# Patient Record
Sex: FEMALE | Race: OTHER | NOT HISPANIC OR LATINO | URBAN - METROPOLITAN AREA
[De-identification: names, ages, dates, MRNs, and addresses within clinical notes are randomized per-mention and may not be internally consistent; named-entity substitution may affect disease eponyms.]

---

## 2018-06-11 PROBLEM — Z00.00 ENCOUNTER FOR PREVENTIVE HEALTH EXAMINATION: Status: ACTIVE | Noted: 2018-06-11

## 2018-06-15 ENCOUNTER — EMERGENCY (EMERGENCY)
Facility: HOSPITAL | Age: 38
LOS: 1 days | Discharge: HOME | End: 2018-06-15
Attending: EMERGENCY MEDICINE | Admitting: EMERGENCY MEDICINE
Payer: COMMERCIAL

## 2018-06-15 VITALS
DIASTOLIC BLOOD PRESSURE: 60 MMHG | TEMPERATURE: 99 F | RESPIRATION RATE: 16 BRPM | OXYGEN SATURATION: 100 % | SYSTOLIC BLOOD PRESSURE: 113 MMHG | HEART RATE: 86 BPM

## 2018-06-15 VITALS
SYSTOLIC BLOOD PRESSURE: 129 MMHG | OXYGEN SATURATION: 100 % | RESPIRATION RATE: 14 BRPM | HEART RATE: 118 BPM | TEMPERATURE: 98 F | DIASTOLIC BLOOD PRESSURE: 84 MMHG

## 2018-06-15 LAB
ALBUMIN SERPL ELPH-MCNC: 4.6 G/DL — SIGNIFICANT CHANGE UP (ref 3.3–5)
ALP SERPL-CCNC: 80 U/L — SIGNIFICANT CHANGE UP (ref 40–120)
ALT FLD-CCNC: 31 U/L — SIGNIFICANT CHANGE UP (ref 4–33)
APPEARANCE UR: CLEAR — SIGNIFICANT CHANGE UP
AST SERPL-CCNC: 26 U/L — SIGNIFICANT CHANGE UP (ref 4–32)
BASOPHILS # BLD AUTO: 0.03 K/UL — SIGNIFICANT CHANGE UP (ref 0–0.2)
BASOPHILS NFR BLD AUTO: 0.3 % — SIGNIFICANT CHANGE UP (ref 0–2)
BILIRUB SERPL-MCNC: 0.4 MG/DL — SIGNIFICANT CHANGE UP (ref 0.2–1.2)
BILIRUB UR-MCNC: NEGATIVE — SIGNIFICANT CHANGE UP
BLD GP AB SCN SERPL QL: NEGATIVE — SIGNIFICANT CHANGE UP
BLOOD UR QL VISUAL: HIGH
BUN SERPL-MCNC: 8 MG/DL — SIGNIFICANT CHANGE UP (ref 7–23)
CALCIUM SERPL-MCNC: 9.6 MG/DL — SIGNIFICANT CHANGE UP (ref 8.4–10.5)
CHLORIDE SERPL-SCNC: 99 MMOL/L — SIGNIFICANT CHANGE UP (ref 98–107)
CO2 SERPL-SCNC: 24 MMOL/L — SIGNIFICANT CHANGE UP (ref 22–31)
COLOR SPEC: SIGNIFICANT CHANGE UP
CREAT SERPL-MCNC: 0.58 MG/DL — SIGNIFICANT CHANGE UP (ref 0.5–1.3)
EOSINOPHIL # BLD AUTO: 0.04 K/UL — SIGNIFICANT CHANGE UP (ref 0–0.5)
EOSINOPHIL NFR BLD AUTO: 0.4 % — SIGNIFICANT CHANGE UP (ref 0–6)
GLUCOSE SERPL-MCNC: 118 MG/DL — HIGH (ref 70–99)
GLUCOSE UR-MCNC: NEGATIVE — SIGNIFICANT CHANGE UP
HCG SERPL-ACNC: SIGNIFICANT CHANGE UP MIU/ML
HCT VFR BLD CALC: 37.4 % — SIGNIFICANT CHANGE UP (ref 34.5–45)
HGB BLD-MCNC: 12.8 G/DL — SIGNIFICANT CHANGE UP (ref 11.5–15.5)
IMM GRANULOCYTES # BLD AUTO: 0.06 # — SIGNIFICANT CHANGE UP
IMM GRANULOCYTES NFR BLD AUTO: 0.6 % — SIGNIFICANT CHANGE UP (ref 0–1.5)
KETONES UR-MCNC: NEGATIVE — SIGNIFICANT CHANGE UP
LEUKOCYTE ESTERASE UR-ACNC: HIGH
LYMPHOCYTES # BLD AUTO: 1.64 K/UL — SIGNIFICANT CHANGE UP (ref 1–3.3)
LYMPHOCYTES # BLD AUTO: 17 % — SIGNIFICANT CHANGE UP (ref 13–44)
MCHC RBC-ENTMCNC: 31.7 PG — SIGNIFICANT CHANGE UP (ref 27–34)
MCHC RBC-ENTMCNC: 34.2 % — SIGNIFICANT CHANGE UP (ref 32–36)
MCV RBC AUTO: 92.6 FL — SIGNIFICANT CHANGE UP (ref 80–100)
MONOCYTES # BLD AUTO: 0.59 K/UL — SIGNIFICANT CHANGE UP (ref 0–0.9)
MONOCYTES NFR BLD AUTO: 6.1 % — SIGNIFICANT CHANGE UP (ref 2–14)
NEUTROPHILS # BLD AUTO: 7.26 K/UL — SIGNIFICANT CHANGE UP (ref 1.8–7.4)
NEUTROPHILS NFR BLD AUTO: 75.6 % — SIGNIFICANT CHANGE UP (ref 43–77)
NITRITE UR-MCNC: NEGATIVE — SIGNIFICANT CHANGE UP
NRBC # FLD: 0 — SIGNIFICANT CHANGE UP
PH UR: 7 — SIGNIFICANT CHANGE UP (ref 4.6–8)
PLATELET # BLD AUTO: 245 K/UL — SIGNIFICANT CHANGE UP (ref 150–400)
PMV BLD: 9.3 FL — SIGNIFICANT CHANGE UP (ref 7–13)
POTASSIUM SERPL-MCNC: 3.7 MMOL/L — SIGNIFICANT CHANGE UP (ref 3.5–5.3)
POTASSIUM SERPL-SCNC: 3.7 MMOL/L — SIGNIFICANT CHANGE UP (ref 3.5–5.3)
PROT SERPL-MCNC: 8.1 G/DL — SIGNIFICANT CHANGE UP (ref 6–8.3)
PROT UR-MCNC: NEGATIVE MG/DL — SIGNIFICANT CHANGE UP
RBC # BLD: 4.04 M/UL — SIGNIFICANT CHANGE UP (ref 3.8–5.2)
RBC # FLD: 12.1 % — SIGNIFICANT CHANGE UP (ref 10.3–14.5)
RBC CASTS # UR COMP ASSIST: >50 — HIGH (ref 0–?)
RH IG SCN BLD-IMP: POSITIVE — SIGNIFICANT CHANGE UP
SODIUM SERPL-SCNC: 139 MMOL/L — SIGNIFICANT CHANGE UP (ref 135–145)
SP GR SPEC: 1.01 — SIGNIFICANT CHANGE UP (ref 1–1.04)
SQUAMOUS # UR AUTO: SIGNIFICANT CHANGE UP
UROBILINOGEN FLD QL: NORMAL MG/DL — SIGNIFICANT CHANGE UP
WBC # BLD: 9.62 K/UL — SIGNIFICANT CHANGE UP (ref 3.8–10.5)
WBC # FLD AUTO: 9.62 K/UL — SIGNIFICANT CHANGE UP (ref 3.8–10.5)
WBC UR QL: HIGH (ref 0–?)

## 2018-06-15 PROCEDURE — 76830 TRANSVAGINAL US NON-OB: CPT | Mod: 26

## 2018-06-15 PROCEDURE — 99284 EMERGENCY DEPT VISIT MOD MDM: CPT | Mod: 25

## 2018-06-15 RX ORDER — SODIUM CHLORIDE 9 MG/ML
1000 INJECTION INTRAMUSCULAR; INTRAVENOUS; SUBCUTANEOUS ONCE
Qty: 0 | Refills: 0 | Status: DISCONTINUED | OUTPATIENT
Start: 2018-06-15 | End: 2018-06-15

## 2018-06-15 NOTE — ED PROVIDER NOTE - ATTENDING CONTRIBUTION TO CARE
37F G1PO at approx 8wks by LMP 4/8 presents with vaginal bleeding.  Reports only notes bleeding after urination. No abdominal pain. Has not yet seen an OB during this pregnancy, but has an appointment scheduled for next Thursday. On exam well appearing, nad, breathing comfortably, abd soft, per PA cervix closed, scant blood in vaginal vault. U/s with IUP, size c/w 6wks, no fetal heatbeat. Concern for fetal demise vs early pregnancy, will need repeat u/s.  Labs o/w reassuring.  Discussed w patient and , understand diagnosis and return precautions.

## 2018-06-15 NOTE — ED PROVIDER NOTE - OBJECTIVE STATEMENT
Pt preferred  translate in Tamil, refused phone translation at this time.  36 yo F, G1PO, currently 8 weeks pregnant, LMP 4/8/18, irregular periods, presents to ER c/o mild vaginal bleeding since yesterday, not associate with abd pain. States noticed mild intermittent vaginal bleeding while going to the bathroom x3 yesterday, and twice today, not using any pads. Denies any spotting, dripping, or active bleeding. Reports she missed her period, saw her PCP and found out she was pregnant, has scheduled appointment to see new GYN Dr. Martin on 6/21/18. Denies any fever, chills, n/v, abdominal pain, pelvic pain, vaginal discharge/clots, injury/trauma, chest pain, sob, dizziness, back pain, leg swelling, recent travel, or any other complaints.

## 2018-06-15 NOTE — ED PROVIDER NOTE - PROGRESS NOTE DETAILS
PA KAY: US shows single intrauterine pregnancy, estimated gestational 6 weeks, no embryonic HR, probable demise, recommends f/u with 1 week. Discussed with attending, pt will need repeat US in one week. PA KAY: US shows single intrauterine pregnancy, estimated gestational 6 weeks, no embryonic HR, probable demise, recommends f/u with 1 week. Discussed with attending, pt will need repeat US in one week. Discussed with attending, patient can dispo home to f/u with GYN. The patient was given verbal and written discharge instructions. Specifically, instructions when to return to the ED and when to seek follow-up from their pcp was discussed. Any specialty follow-up was discussed, including how to make an appointment.  Instructions were discussed in simple, plain language and was understood by the patient. The patient understands that should their symptoms worsen or any new symptoms arise, they should return to the ED immediately for further evaluation. All pt's questions were answered. Patient verbalizes understanding. Instructions translated by .

## 2018-06-15 NOTE — ED PROVIDER NOTE - MEDICAL DECISION MAKING DETAILS
36 yo F, G1PO, currently 8 weeks pregnant, LMP 18, irregular periods, presents to ER c/o mild vaginal bleeding since yesterday, not associate with abd pain.   -well appearing female, , currently 8 weeks pregnant p/w mild intermittent vaginal bleeding, no abd/pelvic pain, no injury/trauma, no dysuria, no hematuria, no n/v. +tachycardia 118. Plan: TVUS r/o ectopic pregnancy, labs, HCG, Ua, T&S, coags, and reassess.

## 2018-06-15 NOTE — ED PROVIDER NOTE - PLAN OF CARE
Follow up with your PMD within 48-72 hrs. Follow up with your OBGYN within 1-2 days, needs to repeat ultrasound in 1 week. Rest, increase your fluids. No heavy lifting. Refrain from sexual intercourse.  Worsening pain, vaginal bleeding more than 1 pad in an hour, vomiting, dizziness, weakness or ANY NEW CONCERNING SYMPTOMS return to the emergency department. Follow up with your PMD within 48-72 hrs. Follow up with your OBGYN 6/21/18 for repeat ultrasound in 1 week. Rest, increase your fluids. No heavy lifting. Refrain from sexual intercourse.  Worsening pain, vaginal bleeding more than 1 pad in an hour, vomiting, dizziness, weakness or ANY NEW CONCERNING SYMPTOMS return to the emergency department.

## 2018-06-15 NOTE — ED ADULT TRIAGE NOTE - CHIEF COMPLAINT QUOTE
Pt c/o vaginal bleeding x 2 days,  approx. 8 weeks pregnant. Pt only notices blood when she wipes, is not soaking through pads. Pt not actively bleeding. Denies pain, nausea, vomiting. Denies PMH. Pt c/o vaginal bleeding x 2 days,  approx. 8 weeks pregnant, LMP 18. Pt only notices blood when she wipes, is not soaking through pads. Pt not actively bleeding. Denies pain, nausea, vomiting. Denies PMH.

## 2018-06-15 NOTE — ED ADULT NURSE NOTE - CHIEF COMPLAINT QUOTE
Pt c/o vaginal bleeding x 2 days,  approx. 8 weeks pregnant, LMP 18. Pt only notices blood when she wipes, is not soaking through pads. Pt not actively bleeding. Denies pain, nausea, vomiting. Denies PMH.

## 2018-06-15 NOTE — ED ADULT NURSE NOTE - OBJECTIVE STATEMENT
Pt received in er room #21 complaining of vaginal spotting x 2days. Pt appears calm and cooperative with  at bedside who translates. Pt primarily speaks Tamil.  Pt denies any abd pain or discomfort pain, pt denies pain anywhere,  patient denies excessive vaginal bleeding or sanitary pad use due to bleed, only states she has spot bleeding. Pt is 8 weeks pregnant. OSCAR Artis did a vaginal exam. Blood and urine were drawn and sent to the lab. Pt transported to /S dept.

## 2018-06-15 NOTE — ED PROVIDER NOTE - NONTENDER LOCATION
left lower quadrant/suprapubic/left upper quadrant/right upper quadrant/umbilical/right costovertebral angle/right lower quadrant/periumbilical/left costovertebral angle

## 2018-06-15 NOTE — ED PROVIDER NOTE - CARE PLAN
Principal Discharge DX:	Vaginal bleeding in pregnancy, first trimester  Assessment and plan of treatment:	Follow up with your PMD within 48-72 hrs. Follow up with your OBGYN within 1-2 days, needs to repeat ultrasound in 1 week. Rest, increase your fluids. No heavy lifting. Refrain from sexual intercourse.  Worsening pain, vaginal bleeding more than 1 pad in an hour, vomiting, dizziness, weakness or ANY NEW CONCERNING SYMPTOMS return to the emergency department. Principal Discharge DX:	Vaginal bleeding in pregnancy, first trimester  Assessment and plan of treatment:	Follow up with your PMD within 48-72 hrs. Follow up with your OBGYN 6/21/18 for repeat ultrasound in 1 week. Rest, increase your fluids. No heavy lifting. Refrain from sexual intercourse.  Worsening pain, vaginal bleeding more than 1 pad in an hour, vomiting, dizziness, weakness or ANY NEW CONCERNING SYMPTOMS return to the emergency department.

## 2018-06-16 LAB
BACTERIA UR CULT: SIGNIFICANT CHANGE UP
SPECIMEN SOURCE: SIGNIFICANT CHANGE UP

## 2018-06-17 ENCOUNTER — EMERGENCY (EMERGENCY)
Facility: HOSPITAL | Age: 38
LOS: 1 days | Discharge: HOME | End: 2018-06-17
Attending: EMERGENCY MEDICINE | Admitting: EMERGENCY MEDICINE
Payer: COMMERCIAL

## 2018-06-17 ENCOUNTER — RESULT REVIEW (OUTPATIENT)
Age: 38
End: 2018-06-17

## 2018-06-17 VITALS
DIASTOLIC BLOOD PRESSURE: 73 MMHG | HEART RATE: 122 BPM | SYSTOLIC BLOOD PRESSURE: 123 MMHG | TEMPERATURE: 98 F | OXYGEN SATURATION: 100 % | RESPIRATION RATE: 17 BRPM

## 2018-06-17 VITALS
RESPIRATION RATE: 16 BRPM | DIASTOLIC BLOOD PRESSURE: 61 MMHG | HEART RATE: 98 BPM | SYSTOLIC BLOOD PRESSURE: 98 MMHG | OXYGEN SATURATION: 100 %

## 2018-06-17 DIAGNOSIS — O03.9 COMPLETE OR UNSPECIFIED SPONTANEOUS ABORTION WITHOUT COMPLICATION: ICD-10-CM

## 2018-06-17 LAB
ALBUMIN SERPL ELPH-MCNC: 4.8 G/DL — SIGNIFICANT CHANGE UP (ref 3.3–5)
ALP SERPL-CCNC: 90 U/L — SIGNIFICANT CHANGE UP (ref 40–120)
ALT FLD-CCNC: 40 U/L — HIGH (ref 4–33)
AST SERPL-CCNC: 44 U/L — HIGH (ref 4–32)
BASOPHILS # BLD AUTO: 0.05 K/UL — SIGNIFICANT CHANGE UP (ref 0–0.2)
BASOPHILS NFR BLD AUTO: 0.3 % — SIGNIFICANT CHANGE UP (ref 0–2)
BILIRUB SERPL-MCNC: 0.3 MG/DL — SIGNIFICANT CHANGE UP (ref 0.2–1.2)
BUN SERPL-MCNC: 9 MG/DL — SIGNIFICANT CHANGE UP (ref 7–23)
CALCIUM SERPL-MCNC: 9.9 MG/DL — SIGNIFICANT CHANGE UP (ref 8.4–10.5)
CHLORIDE SERPL-SCNC: 101 MMOL/L — SIGNIFICANT CHANGE UP (ref 98–107)
CO2 SERPL-SCNC: 22 MMOL/L — SIGNIFICANT CHANGE UP (ref 22–31)
CREAT SERPL-MCNC: 0.59 MG/DL — SIGNIFICANT CHANGE UP (ref 0.5–1.3)
EOSINOPHIL # BLD AUTO: 0.04 K/UL — SIGNIFICANT CHANGE UP (ref 0–0.5)
EOSINOPHIL NFR BLD AUTO: 0.3 % — SIGNIFICANT CHANGE UP (ref 0–6)
GLUCOSE SERPL-MCNC: 90 MG/DL — SIGNIFICANT CHANGE UP (ref 70–99)
HCG SERPL-ACNC: SIGNIFICANT CHANGE UP MIU/ML
HCT VFR BLD CALC: 38.3 % — SIGNIFICANT CHANGE UP (ref 34.5–45)
HGB BLD-MCNC: 13.5 G/DL — SIGNIFICANT CHANGE UP (ref 11.5–15.5)
IMM GRANULOCYTES # BLD AUTO: 0.07 # — SIGNIFICANT CHANGE UP
IMM GRANULOCYTES NFR BLD AUTO: 0.5 % — SIGNIFICANT CHANGE UP (ref 0–1.5)
LYMPHOCYTES # BLD AUTO: 1.6 K/UL — SIGNIFICANT CHANGE UP (ref 1–3.3)
LYMPHOCYTES # BLD AUTO: 10.9 % — LOW (ref 13–44)
MCHC RBC-ENTMCNC: 31.3 PG — SIGNIFICANT CHANGE UP (ref 27–34)
MCHC RBC-ENTMCNC: 35.2 % — SIGNIFICANT CHANGE UP (ref 32–36)
MCV RBC AUTO: 88.9 FL — SIGNIFICANT CHANGE UP (ref 80–100)
MONOCYTES # BLD AUTO: 0.8 K/UL — SIGNIFICANT CHANGE UP (ref 0–0.9)
MONOCYTES NFR BLD AUTO: 5.4 % — SIGNIFICANT CHANGE UP (ref 2–14)
NEUTROPHILS # BLD AUTO: 12.14 K/UL — HIGH (ref 1.8–7.4)
NEUTROPHILS NFR BLD AUTO: 82.6 % — HIGH (ref 43–77)
NRBC # FLD: 0 — SIGNIFICANT CHANGE UP
PLATELET # BLD AUTO: 267 K/UL — SIGNIFICANT CHANGE UP (ref 150–400)
PMV BLD: 9 FL — SIGNIFICANT CHANGE UP (ref 7–13)
POTASSIUM SERPL-MCNC: 4.5 MMOL/L — SIGNIFICANT CHANGE UP (ref 3.5–5.3)
POTASSIUM SERPL-SCNC: 4.5 MMOL/L — SIGNIFICANT CHANGE UP (ref 3.5–5.3)
PROT SERPL-MCNC: 8.2 G/DL — SIGNIFICANT CHANGE UP (ref 6–8.3)
RBC # BLD: 4.31 M/UL — SIGNIFICANT CHANGE UP (ref 3.8–5.2)
RBC # FLD: 12.1 % — SIGNIFICANT CHANGE UP (ref 10.3–14.5)
SODIUM SERPL-SCNC: 140 MMOL/L — SIGNIFICANT CHANGE UP (ref 135–145)
WBC # BLD: 14.7 K/UL — HIGH (ref 3.8–10.5)
WBC # FLD AUTO: 14.7 K/UL — HIGH (ref 3.8–10.5)

## 2018-06-17 PROCEDURE — 99285 EMERGENCY DEPT VISIT HI MDM: CPT | Mod: 25

## 2018-06-17 PROCEDURE — 88305 TISSUE EXAM BY PATHOLOGIST: CPT | Mod: 26

## 2018-06-17 PROCEDURE — 76830 TRANSVAGINAL US NON-OB: CPT | Mod: 26

## 2018-06-17 RX ORDER — SODIUM CHLORIDE 9 MG/ML
1000 INJECTION INTRAMUSCULAR; INTRAVENOUS; SUBCUTANEOUS ONCE
Qty: 0 | Refills: 0 | Status: COMPLETED | OUTPATIENT
Start: 2018-06-17 | End: 2018-06-17

## 2018-06-17 RX ADMIN — SODIUM CHLORIDE 1000 MILLILITER(S): 9 INJECTION INTRAMUSCULAR; INTRAVENOUS; SUBCUTANEOUS at 08:35

## 2018-06-17 NOTE — ED PROVIDER NOTE - PROGRESS NOTE DETAILS
AK: Pelvic exam, chaperone felix. Blood pooling in the vaginal canal. Product of conception noted and pulled out. Placed in cup and labeled. Cervix open to fingertip. No active bleeding noted. AK: Seen by OB. specimen taken by OB. OB recommends DC and f/u at already scheduled appt.

## 2018-06-17 NOTE — ED PROVIDER NOTE - PHYSICAL EXAMINATION
Gen: No acute distress, alert, cooperative  Head: Normocephalic, Atraumatic  HEENT: PERRL, oral mucosa moist, normal conjunctiva  Lung: CTAB, no respiratory distress, no crackles or wheezes  CV: rrr, no murmur  Abd: soft, NTND, no rebound or guarding  MSK: No LE edema, no visible deformities  Neuro: No focal neurologic deficits  Skin: Warm and dry, no evidence of rash   Psych: normal affect, follows commands

## 2018-06-17 NOTE — ED ADULT NURSE REASSESSMENT NOTE - NS ED NURSE REASSESS COMMENT FT1
IV access obtained.  Labs drawn and sent.  Pt endorses vaginal bleeding.  Pad count of 3.  Awaiting US.  Call bell within reach, spouse at bedside.  Instructed to call for assistance as needed.  Continue to monitor.

## 2018-06-17 NOTE — ED PROVIDER NOTE - OBJECTIVE STATEMENT
38 yo F, G1PO, LMP 4/8/18, recently in ED 2 days ago for vaginal bleeding found to have fetus with no embryonic HR and was told to f/u in 1 week. Comes in today with worsening bleeding, has gone through 2 pads today. Also with cramping with bleeding. Vaginal bleeding with some clots and clearish liquid.   Denies any fever, chills, n/v, chest pain, sob, dizziness, back pain, leg swelling.

## 2018-06-17 NOTE — ED ADULT NURSE REASSESSMENT NOTE - NS ED NURSE REASSESS COMMENT FT1
Pt ambulating to restroom with spouse.  OB/GYN currently at bedside to examine pt.  Pt endorses 1 pad since arrival.

## 2018-06-17 NOTE — CONSULT NOTE ADULT - SUBJECTIVE AND OBJECTIVE BOX
37y Last Menstrual Period 18 approx 6w GA by US in ED 6/15 with likely MAB, Pt began bleeding this AM with passage of clots and tissue. Patient denies headache, chest pain, shortness of breath, fevers, chills, nausea, vomiting, diarrhea, epigastric pain, urinary symptoms.         OB/GYN HISTORY: none  PAST MEDICAL & SURGICAL HISTORY:  No pertinent past medical history  No significant past surgical history    Allergies    No Known Allergies    Intolerances      MEDICATIONS  (STANDING):none    FAMILY HISTORY:  No pertinent family history in first degree relatives    SOCIAL HISTORY:nc    Name of GYN Physician: Mario         Vital Signs Last 24 Hrs  T(C): 36.8 (2018 06:17), Max: 36.8 (2018 06:17)  T(F): 98.3 (2018 06:17), Max: 98.3 (2018 06:17)  HR: 98 (2018 12:00) (96 - 122)  BP: 98/61 (2018 12:00) (98/61 - 123/73)  BP(mean): --  RR: 16 (2018 12:00) (16 - 17)  SpO2: 100% (2018 12:00) (100% - 100%)    PHYSICAL EXAM:      Constitutional: alert and oriented x 3    Gastrointestinal: soft, non tender, non-distended,  no rebound/guarding, + bowel sounds. No organomegaly, no palpable masses    Genitourinary: Normal external female  exam  Cervix: 0.5/long, no CMT  Vaginal: normal vaginal mucosa, 25cc blood in vault  Uterus: Normal size, non tender  Adnexa: Non tender bilaterally, no palpable masses    Rectal: deferred    Extremities: Non-tender bilaterally, No edema    Neurological: Grossly intact            LABS:                        13.5   14.70 )-----------( 267      ( 2018 08:46 )             38.3         140  |  101  |  9   ----------------------------<  90  4.5   |  22  |  0.59    Ca    9.9      2018 08:46    TPro  8.2  /  Alb  4.8  /  TBili  0.3  /  DBili  x   /  AST  44<H>  /  ALT  40<H>  /  AlkPhos  90            Blood Type: O Positive      RADIOLOGY & ADDITIONAL STUDIES:< from: US Transvaginal (18 @ 10:52) >    Uterus: Anteverted. 8.4 x 4.4 x 5.3 cm.    Endometrium: Previously identified intrauterine gestation is no longer   visualized. The endometrium is avascular and heterogeneous in appearance,   measuring 17 mm, some of which represents blood products..    Right ovary: 3.7 x 1.6 x 3.0 cm. Corpus luteal cyst measuring 1.7 x 1.0 x   1.6 cm..    Left ovary: 2.6 x 1.1 x 2.3 cm. Within normal limits.    Fluid: None.    IMPRESSION:   in progress, heterogeneous endometrium without previously   identified intrauterine gestation.    < end of copied text >

## 2018-06-17 NOTE — CONSULT NOTE ADULT - PROBLEM SELECTOR RECOMMENDATION 9
Pt with avascular endometrium, no retained POC  Blood product will pass spontaneously  VS WNL, Hct stable  POC sent to path     Follow up with Dr Martin as scheduled    LScanlon R3

## 2018-06-17 NOTE — ED PROVIDER NOTE - ATTENDING CONTRIBUTION TO CARE
37F G1PO at approx 9wks by LMP  presents with vaginal bleeding. Seen in ED 2d ago with u/s showing embryo at 6wks and no fetal heartbeat concerning  for fetal demise vs early pregnancy. Started to develop increased vaginal bleeding yesterday that increased today, small abd cramping. On exam well appearing, nad, breathing comfortably, abd soft, + blood in vaginal vault, tissue v clot removed from cervix, open to FT, 2+ pulses, no edema. U/s with no IUP likely  in progress. OB consult, will send tissue sample for analysis. Has gyn followup this week.

## 2018-06-17 NOTE — ED ADULT NURSE NOTE - OBJECTIVE STATEMENT
38 yo F A&Ox4.  Returning to ER for vaginal bleeding and clotting increasing today.   Pt primarily speaks Tamil  at bedside who translates.  Pt was seen in ED x 2 days ago and had US with no embryonic heartbeat.   LMP 18  Denies any cramping currently.  2 pads used today.

## 2018-06-17 NOTE — ED ADULT TRIAGE NOTE - CHIEF COMPLAINT QUOTE
Returning to ER for vaginal bleeding and clotting increasing today. Was seen in ED x 2 days ago and had US confirming pregnancy with no embryonic heartbeat advised to see ob/gyn to f/u.  LMP 18 Denies any cramping currently. 2 pads used today. Patient tachycardic in triage, denies weakness or dizziness. Denies Med Hx

## 2018-06-17 NOTE — ED PROVIDER NOTE - MEDICAL DECISION MAKING DETAILS
38 yo F, G1PO, LMP 4/8/18, recently in ED 2 days ago for vaginal bleeding found to have fetus with no embryonic HR and was told to f/u in 1 week. Comes in today with worsening bleeding and cramping abdominal pain. Labs, transvaginal u/s, beta hcg. Likely fetal demise.

## 2018-06-20 LAB — SURGICAL PATHOLOGY STUDY: SIGNIFICANT CHANGE UP

## 2018-06-21 ENCOUNTER — ASOB RESULT (OUTPATIENT)
Age: 38
End: 2018-06-21

## 2018-06-21 ENCOUNTER — APPOINTMENT (OUTPATIENT)
Dept: OBGYN | Facility: CLINIC | Age: 38
End: 2018-06-21
Payer: COMMERCIAL

## 2018-06-21 VITALS
HEART RATE: 83 BPM | WEIGHT: 102.06 LBS | DIASTOLIC BLOOD PRESSURE: 78 MMHG | HEIGHT: 56 IN | BODY MASS INDEX: 22.96 KG/M2 | SYSTOLIC BLOOD PRESSURE: 114 MMHG

## 2018-06-21 DIAGNOSIS — Z82.49 FAMILY HISTORY OF ISCHEMIC HEART DISEASE AND OTHER DISEASES OF THE CIRCULATORY SYSTEM: ICD-10-CM

## 2018-06-21 DIAGNOSIS — Z78.9 OTHER SPECIFIED HEALTH STATUS: ICD-10-CM

## 2018-06-21 PROCEDURE — 99203 OFFICE O/P NEW LOW 30 MIN: CPT

## 2018-06-21 PROCEDURE — 76817 TRANSVAGINAL US OBSTETRIC: CPT

## 2018-06-21 NOTE — ED POST DISCHARGE NOTE - REASON FOR FOLLOW-UP
Other Contacted pt regarding surgical pathology report.  Pt states she has a follow up appt today with OBGYN Dr. Sheppard at 2:30.  Called Dr. Sheppard [phone] and faxed results [phone].  Fax confirmation sheet received.

## 2018-07-10 PROBLEM — Z82.49 FAMILY HISTORY OF CONGESTIVE HEART FAILURE: Status: ACTIVE | Noted: 2018-07-10

## 2018-07-10 LAB — HCG SERPL-MCNC: 1040 MIU/ML

## 2018-09-18 ENCOUNTER — EMERGENCY (EMERGENCY)
Facility: HOSPITAL | Age: 38
LOS: 1 days | Discharge: HOME | End: 2018-09-18
Attending: EMERGENCY MEDICINE | Admitting: EMERGENCY MEDICINE
Payer: MEDICAID

## 2018-09-18 VITALS
RESPIRATION RATE: 16 BRPM | HEART RATE: 100 BPM | OXYGEN SATURATION: 100 % | DIASTOLIC BLOOD PRESSURE: 72 MMHG | TEMPERATURE: 98 F | SYSTOLIC BLOOD PRESSURE: 116 MMHG

## 2018-09-18 LAB
APPEARANCE UR: CLEAR — SIGNIFICANT CHANGE UP
BILIRUB UR-MCNC: NEGATIVE — SIGNIFICANT CHANGE UP
BLOOD UR QL VISUAL: NEGATIVE — SIGNIFICANT CHANGE UP
COLOR SPEC: COLORLESS — SIGNIFICANT CHANGE UP
GLUCOSE UR-MCNC: NEGATIVE — SIGNIFICANT CHANGE UP
KETONES UR-MCNC: NEGATIVE — SIGNIFICANT CHANGE UP
LEUKOCYTE ESTERASE UR-ACNC: NEGATIVE — SIGNIFICANT CHANGE UP
NITRITE UR-MCNC: NEGATIVE — SIGNIFICANT CHANGE UP
PH UR: 6 — SIGNIFICANT CHANGE UP (ref 5–8)
PROT UR-MCNC: NEGATIVE — SIGNIFICANT CHANGE UP
SP GR SPEC: 1.01 — SIGNIFICANT CHANGE UP (ref 1–1.04)
UROBILINOGEN FLD QL: NORMAL — SIGNIFICANT CHANGE UP

## 2018-09-18 PROCEDURE — 99283 EMERGENCY DEPT VISIT LOW MDM: CPT | Mod: 25

## 2018-09-18 NOTE — ED PROVIDER NOTE - ATTENDING CONTRIBUTION TO CARE
37 yo F , last LMP , requesting pregnancy test. she has no complaints at this time. denies abd pain or bleeding or urinary complaints.   PE normal.   No symptoms. Normal physical exam. Upreg positive for pregnancy. results d/w pt. has no s/s c/f ectopic pregnancy, no urgent workup indicated in the ER at this time. Patient was discharged with instructions to drink fluids, prenatal multivitamins, and follow up with obgyn in 1-2 days for repeat evaluation and further management.    The patient was discharged from the ED in stable condition. All results of today's workup were discussed with the patient and all questions/concerns were addressed. All discharge instructions were thoroughly discussed with the patient, as well as important warning signs and new/ worsening symptoms which should necessitate patient's immediate return to the ED. The patient is agreeable with discharge and expresses full understanding of all instructions given.

## 2018-09-18 NOTE — ED PROVIDER NOTE - OBJECTIVE STATEMENT
37yo F no pmx here to check if she is pregnant and has not taken a home pregnancy test. LMP 7/18/18. No sx. No lightheadedness, chest pain, sob, n/v/d, abd pain, vaginal bleeding or discharge, or urinary complaints. 37yo F , no pmx here to check if she is pregnant and has not taken a home pregnancy test. LMP 18. No sx. No lightheadedness, chest pain, sob, n/v/d, abd pain, vaginal bleeding or discharge, or urinary complaints.

## 2018-09-18 NOTE — ED ADULT TRIAGE NOTE - CHIEF COMPLAINT QUOTE
pt comes to ED for pregnancy test. pt states she hasn't had her period since July 18th. pt denies going to obgyn or buying at home pregnancy test. pt and  do not use prtoection and pt is not on birth control. pt VSS pt denies pain or any other complaints. NAD

## 2018-09-18 NOTE — ED PROVIDER NOTE - MEDICAL DECISION MAKING DETAILS
F here to check if she's pregnant. No symptoms. Normal physical exam. Upreg and . see attending note

## 2018-10-03 ENCOUNTER — APPOINTMENT (OUTPATIENT)
Dept: OBGYN | Facility: CLINIC | Age: 38
End: 2018-10-03
Payer: MEDICAID

## 2018-10-03 ENCOUNTER — ASOB RESULT (OUTPATIENT)
Age: 38
End: 2018-10-03

## 2018-10-03 VITALS
WEIGHT: 106.2 LBS | HEART RATE: 103 BPM | SYSTOLIC BLOOD PRESSURE: 122 MMHG | DIASTOLIC BLOOD PRESSURE: 80 MMHG | BODY MASS INDEX: 23.89 KG/M2 | HEIGHT: 56 IN

## 2018-10-03 DIAGNOSIS — O03.9 COMPLETE OR UNSPECIFIED SPONTANEOUS ABORTION W/OUT COMPLICATION: ICD-10-CM

## 2018-10-03 PROCEDURE — 99214 OFFICE O/P EST MOD 30 MIN: CPT

## 2018-10-03 PROCEDURE — 76801 OB US < 14 WKS SINGLE FETUS: CPT

## 2018-10-04 ENCOUNTER — OTHER (OUTPATIENT)
Age: 38
End: 2018-10-04

## 2018-10-04 DIAGNOSIS — R89.9 UNSPECIFIED ABNORMAL FINDING IN SPECIMENS FROM OTHER ORGANS, SYSTEMS AND TISSUES: ICD-10-CM

## 2018-10-04 PROBLEM — O03.9 COMPLETE SPONTANEOUS ABORTION: Status: RESOLVED | Noted: 2018-06-21 | Resolved: 2018-10-04

## 2018-10-04 LAB
ABO + RH PNL BLD: NORMAL
BASOPHILS # BLD AUTO: 0.02 K/UL
BASOPHILS NFR BLD AUTO: 0.2 %
BLD GP AB SCN SERPL QL: NORMAL
EOSINOPHIL # BLD AUTO: 0.07 K/UL
EOSINOPHIL NFR BLD AUTO: 0.7 %
HBV SURFACE AG SER QL: NONREACTIVE
HCT VFR BLD CALC: 37.9 %
HCV AB SER QL: NONREACTIVE
HCV S/CO RATIO: 0.06 S/CO
HGB BLD-MCNC: 13 G/DL
HIV1+2 AB SPEC QL IA.RAPID: NONREACTIVE
IMM GRANULOCYTES NFR BLD AUTO: 0.5 %
LYMPHOCYTES # BLD AUTO: 1.82 K/UL
LYMPHOCYTES NFR BLD AUTO: 17.1 %
MAN DIFF?: NORMAL
MCHC RBC-ENTMCNC: 32.7 PG
MCHC RBC-ENTMCNC: 34.3 GM/DL
MCV RBC AUTO: 95.5 FL
MONOCYTES # BLD AUTO: 0.7 K/UL
MONOCYTES NFR BLD AUTO: 6.6 %
NEUTROPHILS # BLD AUTO: 7.98 K/UL
NEUTROPHILS NFR BLD AUTO: 74.9 %
PLATELET # BLD AUTO: 238 K/UL
RBC # BLD: 3.97 M/UL
RBC # FLD: 13.6 %
RUBV IGG FLD-ACNC: 3 INDEX
RUBV IGG SER-IMP: POSITIVE
T GONDII AB SER-IMP: NEGATIVE
T GONDII IGG SER QL: <3 IU/ML
T PALLIDUM AB SER QL IA: NEGATIVE
VZV AB TITR SER: POSITIVE
VZV IGG SER IF-ACNC: 1450 INDEX
WBC # FLD AUTO: 10.64 K/UL

## 2018-10-05 ENCOUNTER — APPOINTMENT (OUTPATIENT)
Dept: OBGYN | Facility: CLINIC | Age: 38
End: 2018-10-05

## 2018-10-08 LAB
BACTERIA UR CULT: NORMAL
C TRACH RRNA SPEC QL NAA+PROBE: NOT DETECTED
CYTOLOGY CVX/VAG DOC THIN PREP: NORMAL
HGB A MFR BLD: 96.9 %
HGB A2 MFR BLD: 3.1 %
HGB FRACT BLD-IMP: NORMAL
HPV HIGH+LOW RISK DNA PNL CVX: NOT DETECTED
LEAD BLD-MCNC: 2 UG/DL
N GONORRHOEA RRNA SPEC QL NAA+PROBE: NOT DETECTED
SOURCE AMPLIFICATION: NORMAL
T GONDII AB SER-IMP: POSITIVE
T GONDII IGM SER QL: 10.4 AU/ML

## 2018-10-23 ENCOUNTER — ASOB RESULT (OUTPATIENT)
Age: 38
End: 2018-10-23

## 2018-10-23 ENCOUNTER — APPOINTMENT (OUTPATIENT)
Dept: ANTEPARTUM | Facility: CLINIC | Age: 38
End: 2018-10-23
Payer: MEDICAID

## 2018-10-23 PROCEDURE — 36416 COLLJ CAPILLARY BLOOD SPEC: CPT

## 2018-10-23 PROCEDURE — 76813 OB US NUCHAL MEAS 1 GEST: CPT

## 2018-10-23 PROCEDURE — 76801 OB US < 14 WKS SINGLE FETUS: CPT

## 2018-10-24 ENCOUNTER — ASOB RESULT (OUTPATIENT)
Age: 38
End: 2018-10-24

## 2018-10-24 ENCOUNTER — APPOINTMENT (OUTPATIENT)
Dept: ANTEPARTUM | Facility: CLINIC | Age: 38
End: 2018-10-24
Payer: MEDICAID

## 2018-10-24 PROCEDURE — 99201 OFFICE OUTPATIENT NEW 10 MINUTES: CPT

## 2018-10-26 ENCOUNTER — RECORD ABSTRACTING (OUTPATIENT)
Age: 38
End: 2018-10-26

## 2018-10-26 LAB
AR GENE MUT ANL BLD/T: NEGATIVE
B19V IGG SER QL IA: 0.9 INDEX
B19V IGG+IGM SER-IMP: ABNORMAL
B19V IGG+IGM SER-IMP: NORMAL
B19V IGM FLD-ACNC: 0.1 INDEX
B19V IGM SER-ACNC: NEGATIVE
CFTR MUT TESTED BLD/T: NEGATIVE
FMR1 GENE MUT ANL BLD/T: NORMAL
MISCELLANEOUS TEST: NORMAL
PROC NAME: NORMAL

## 2018-10-29 ENCOUNTER — APPOINTMENT (OUTPATIENT)
Dept: OBGYN | Facility: CLINIC | Age: 38
End: 2018-10-29

## 2018-10-31 ENCOUNTER — APPOINTMENT (OUTPATIENT)
Dept: OBGYN | Facility: CLINIC | Age: 38
End: 2018-10-31
Payer: MEDICAID

## 2018-10-31 VITALS
WEIGHT: 108 LBS | BODY MASS INDEX: 24.3 KG/M2 | HEIGHT: 56 IN | SYSTOLIC BLOOD PRESSURE: 112 MMHG | DIASTOLIC BLOOD PRESSURE: 72 MMHG

## 2018-10-31 PROCEDURE — 90471 IMMUNIZATION ADMIN: CPT

## 2018-10-31 PROCEDURE — 0502F SUBSEQUENT PRENATAL CARE: CPT

## 2018-10-31 PROCEDURE — 90688 IIV4 VACCINE SPLT 0.5 ML IM: CPT

## 2018-10-31 RX ORDER — IRON/IRON ASP GLY/FA/MV-MIN 38 125-25-1MG
TABLET ORAL
Refills: 0 | Status: DISCONTINUED | COMMUNITY
End: 2018-10-31

## 2018-11-29 ENCOUNTER — APPOINTMENT (OUTPATIENT)
Dept: OBGYN | Facility: CLINIC | Age: 38
End: 2018-11-29
Payer: MEDICAID

## 2018-11-29 ENCOUNTER — NON-APPOINTMENT (OUTPATIENT)
Age: 38
End: 2018-11-29

## 2018-11-29 VITALS
DIASTOLIC BLOOD PRESSURE: 84 MMHG | SYSTOLIC BLOOD PRESSURE: 121 MMHG | BODY MASS INDEX: 24.97 KG/M2 | HEIGHT: 56 IN | WEIGHT: 111 LBS

## 2018-11-29 PROCEDURE — 0502F SUBSEQUENT PRENATAL CARE: CPT

## 2018-12-05 ENCOUNTER — ASOB RESULT (OUTPATIENT)
Age: 38
End: 2018-12-05

## 2018-12-05 ENCOUNTER — APPOINTMENT (OUTPATIENT)
Dept: ANTEPARTUM | Facility: CLINIC | Age: 38
End: 2018-12-05
Payer: MEDICAID

## 2018-12-05 PROCEDURE — 76820 UMBILICAL ARTERY ECHO: CPT

## 2018-12-05 PROCEDURE — 76811 OB US DETAILED SNGL FETUS: CPT

## 2018-12-05 PROCEDURE — 93975 VASCULAR STUDY: CPT

## 2018-12-12 ENCOUNTER — APPOINTMENT (OUTPATIENT)
Dept: ANTEPARTUM | Facility: CLINIC | Age: 38
End: 2018-12-12
Payer: MEDICAID

## 2018-12-12 ENCOUNTER — ASOB RESULT (OUTPATIENT)
Age: 38
End: 2018-12-12

## 2018-12-12 PROCEDURE — 76816 OB US FOLLOW-UP PER FETUS: CPT

## 2018-12-20 ENCOUNTER — APPOINTMENT (OUTPATIENT)
Dept: ANTEPARTUM | Facility: CLINIC | Age: 38
End: 2018-12-20
Payer: MEDICAID

## 2018-12-20 ENCOUNTER — ASOB RESULT (OUTPATIENT)
Age: 38
End: 2018-12-20

## 2018-12-20 PROCEDURE — 76820 UMBILICAL ARTERY ECHO: CPT

## 2018-12-20 PROCEDURE — 76816 OB US FOLLOW-UP PER FETUS: CPT

## 2018-12-25 ENCOUNTER — NON-APPOINTMENT (OUTPATIENT)
Age: 38
End: 2018-12-25

## 2018-12-26 ENCOUNTER — APPOINTMENT (OUTPATIENT)
Dept: OBGYN | Facility: CLINIC | Age: 38
End: 2018-12-26
Payer: MEDICAID

## 2018-12-26 ENCOUNTER — NON-APPOINTMENT (OUTPATIENT)
Age: 38
End: 2018-12-26

## 2018-12-26 VITALS
WEIGHT: 113 LBS | HEIGHT: 56 IN | BODY MASS INDEX: 25.42 KG/M2 | SYSTOLIC BLOOD PRESSURE: 124 MMHG | DIASTOLIC BLOOD PRESSURE: 79 MMHG

## 2018-12-26 PROCEDURE — 0502F SUBSEQUENT PRENATAL CARE: CPT

## 2019-01-10 ENCOUNTER — ASOB RESULT (OUTPATIENT)
Age: 39
End: 2019-01-10

## 2019-01-10 ENCOUNTER — APPOINTMENT (OUTPATIENT)
Dept: ANTEPARTUM | Facility: CLINIC | Age: 39
End: 2019-01-10
Payer: MEDICAID

## 2019-01-10 PROCEDURE — 76816 OB US FOLLOW-UP PER FETUS: CPT

## 2019-01-24 ENCOUNTER — LABORATORY RESULT (OUTPATIENT)
Age: 39
End: 2019-01-24

## 2019-01-24 ENCOUNTER — NON-APPOINTMENT (OUTPATIENT)
Age: 39
End: 2019-01-24

## 2019-01-24 ENCOUNTER — APPOINTMENT (OUTPATIENT)
Dept: OBGYN | Facility: CLINIC | Age: 39
End: 2019-01-24
Payer: MEDICAID

## 2019-01-24 VITALS
DIASTOLIC BLOOD PRESSURE: 74 MMHG | BODY MASS INDEX: 27.67 KG/M2 | WEIGHT: 123 LBS | SYSTOLIC BLOOD PRESSURE: 109 MMHG | HEIGHT: 56 IN

## 2019-01-24 PROCEDURE — 0502F SUBSEQUENT PRENATAL CARE: CPT

## 2019-01-27 LAB
BASOPHILS # BLD AUTO: 0 K/UL
BASOPHILS NFR BLD AUTO: 0 %
EOSINOPHIL # BLD AUTO: 0.34 K/UL
EOSINOPHIL NFR BLD AUTO: 2.7 %
GLUCOSE 1H P 50 G GLC PO SERPL-MCNC: 111 MG/DL
HCT VFR BLD CALC: 37 %
HGB BLD-MCNC: 11.8 G/DL
LYMPHOCYTES # BLD AUTO: 1.46 K/UL
LYMPHOCYTES NFR BLD AUTO: 11.6 %
MAN DIFF?: NORMAL
MCHC RBC-ENTMCNC: 31 PG
MCHC RBC-ENTMCNC: 31.9 GM/DL
MCV RBC AUTO: 97.1 FL
MONOCYTES # BLD AUTO: 0.34 K/UL
MONOCYTES NFR BLD AUTO: 2.7 %
NEUTROPHILS # BLD AUTO: 10.1 K/UL
NEUTROPHILS NFR BLD AUTO: 80.3 %
PLATELET # BLD AUTO: 216 K/UL
RBC # BLD: 3.81 M/UL
RBC # FLD: 14.2 %
WBC # FLD AUTO: 12.58 K/UL

## 2019-02-04 ENCOUNTER — APPOINTMENT (OUTPATIENT)
Dept: ANTEPARTUM | Facility: CLINIC | Age: 39
End: 2019-02-04
Payer: MEDICAID

## 2019-02-04 ENCOUNTER — ASOB RESULT (OUTPATIENT)
Age: 39
End: 2019-02-04

## 2019-02-04 PROCEDURE — 76816 OB US FOLLOW-UP PER FETUS: CPT

## 2019-02-04 PROCEDURE — 76819 FETAL BIOPHYS PROFIL W/O NST: CPT

## 2019-02-04 PROCEDURE — 76820 UMBILICAL ARTERY ECHO: CPT

## 2019-02-14 ENCOUNTER — NON-APPOINTMENT (OUTPATIENT)
Age: 39
End: 2019-02-14

## 2019-02-14 ENCOUNTER — APPOINTMENT (OUTPATIENT)
Dept: OBGYN | Facility: CLINIC | Age: 39
End: 2019-02-14
Payer: MEDICAID

## 2019-02-14 VITALS
SYSTOLIC BLOOD PRESSURE: 113 MMHG | BODY MASS INDEX: 27.9 KG/M2 | DIASTOLIC BLOOD PRESSURE: 76 MMHG | WEIGHT: 124 LBS | HEIGHT: 56 IN

## 2019-02-14 PROCEDURE — 0502F SUBSEQUENT PRENATAL CARE: CPT

## 2019-02-19 ENCOUNTER — APPOINTMENT (OUTPATIENT)
Dept: ANTEPARTUM | Facility: CLINIC | Age: 39
End: 2019-02-19
Payer: MEDICAID

## 2019-02-19 ENCOUNTER — ASOB RESULT (OUTPATIENT)
Age: 39
End: 2019-02-19

## 2019-02-19 PROCEDURE — 76816 OB US FOLLOW-UP PER FETUS: CPT

## 2019-02-19 PROCEDURE — 76819 FETAL BIOPHYS PROFIL W/O NST: CPT

## 2019-02-19 PROCEDURE — 76820 UMBILICAL ARTERY ECHO: CPT

## 2019-02-28 ENCOUNTER — APPOINTMENT (OUTPATIENT)
Dept: ANTEPARTUM | Facility: CLINIC | Age: 39
End: 2019-02-28
Payer: MEDICAID

## 2019-02-28 ENCOUNTER — OUTPATIENT (OUTPATIENT)
Dept: OUTPATIENT SERVICES | Facility: HOSPITAL | Age: 39
LOS: 1 days | End: 2019-02-28

## 2019-02-28 ENCOUNTER — APPOINTMENT (OUTPATIENT)
Dept: OBGYN | Facility: CLINIC | Age: 39
End: 2019-02-28
Payer: MEDICAID

## 2019-02-28 ENCOUNTER — APPOINTMENT (OUTPATIENT)
Dept: ANTEPARTUM | Facility: HOSPITAL | Age: 39
End: 2019-02-28

## 2019-02-28 ENCOUNTER — ASOB RESULT (OUTPATIENT)
Age: 39
End: 2019-02-28

## 2019-02-28 ENCOUNTER — NON-APPOINTMENT (OUTPATIENT)
Age: 39
End: 2019-02-28

## 2019-02-28 VITALS
DIASTOLIC BLOOD PRESSURE: 74 MMHG | WEIGHT: 126 LBS | HEIGHT: 56 IN | SYSTOLIC BLOOD PRESSURE: 115 MMHG | BODY MASS INDEX: 28.34 KG/M2

## 2019-02-28 PROCEDURE — 76820 UMBILICAL ARTERY ECHO: CPT

## 2019-02-28 PROCEDURE — 90715 TDAP VACCINE 7 YRS/> IM: CPT

## 2019-02-28 PROCEDURE — 0502F SUBSEQUENT PRENATAL CARE: CPT

## 2019-02-28 PROCEDURE — 76818 FETAL BIOPHYS PROFILE W/NST: CPT | Mod: 26

## 2019-02-28 PROCEDURE — 90471 IMMUNIZATION ADMIN: CPT

## 2019-03-06 ENCOUNTER — APPOINTMENT (OUTPATIENT)
Dept: ANTEPARTUM | Facility: HOSPITAL | Age: 39
End: 2019-03-06

## 2019-03-06 ENCOUNTER — APPOINTMENT (OUTPATIENT)
Dept: ANTEPARTUM | Facility: CLINIC | Age: 39
End: 2019-03-06
Payer: MEDICAID

## 2019-03-06 ENCOUNTER — ASOB RESULT (OUTPATIENT)
Age: 39
End: 2019-03-06

## 2019-03-06 ENCOUNTER — OUTPATIENT (OUTPATIENT)
Dept: OUTPATIENT SERVICES | Facility: HOSPITAL | Age: 39
LOS: 1 days | End: 2019-03-06

## 2019-03-06 PROCEDURE — 76818 FETAL BIOPHYS PROFILE W/NST: CPT | Mod: 26

## 2019-03-06 PROCEDURE — 76820 UMBILICAL ARTERY ECHO: CPT

## 2019-03-06 PROCEDURE — 76816 OB US FOLLOW-UP PER FETUS: CPT

## 2019-03-08 DIAGNOSIS — O36.5930 MATERNAL CARE FOR OTHER KNOWN OR SUSPECTED POOR FETAL GROWTH, THIRD TRIMESTER, NOT APPLICABLE OR UNSPECIFIED: ICD-10-CM

## 2019-03-08 DIAGNOSIS — O09.513 SUPERVISION OF ELDERLY PRIMIGRAVIDA, THIRD TRIMESTER: ICD-10-CM

## 2019-03-08 DIAGNOSIS — O35.8XX0 MATERNAL CARE FOR OTHER (SUSPECTED) FETAL ABNORMALITY AND DAMAGE, NOT APPLICABLE OR UNSPECIFIED: ICD-10-CM

## 2019-03-08 DIAGNOSIS — Z3A.32 32 WEEKS GESTATION OF PREGNANCY: ICD-10-CM

## 2019-03-14 ENCOUNTER — OUTPATIENT (OUTPATIENT)
Dept: OUTPATIENT SERVICES | Facility: HOSPITAL | Age: 39
LOS: 1 days | End: 2019-03-14

## 2019-03-14 ENCOUNTER — APPOINTMENT (OUTPATIENT)
Dept: OBGYN | Facility: CLINIC | Age: 39
End: 2019-03-14
Payer: MEDICAID

## 2019-03-14 ENCOUNTER — ASOB RESULT (OUTPATIENT)
Age: 39
End: 2019-03-14

## 2019-03-14 ENCOUNTER — NON-APPOINTMENT (OUTPATIENT)
Age: 39
End: 2019-03-14

## 2019-03-14 ENCOUNTER — APPOINTMENT (OUTPATIENT)
Dept: ANTEPARTUM | Facility: HOSPITAL | Age: 39
End: 2019-03-14

## 2019-03-14 ENCOUNTER — APPOINTMENT (OUTPATIENT)
Dept: ANTEPARTUM | Facility: CLINIC | Age: 39
End: 2019-03-14
Payer: MEDICAID

## 2019-03-14 VITALS
DIASTOLIC BLOOD PRESSURE: 78 MMHG | HEIGHT: 56 IN | SYSTOLIC BLOOD PRESSURE: 114 MMHG | WEIGHT: 127 LBS | BODY MASS INDEX: 28.57 KG/M2

## 2019-03-14 PROCEDURE — 76818 FETAL BIOPHYS PROFILE W/NST: CPT | Mod: 26

## 2019-03-14 PROCEDURE — 0502F SUBSEQUENT PRENATAL CARE: CPT

## 2019-03-14 PROCEDURE — 76820 UMBILICAL ARTERY ECHO: CPT

## 2019-03-18 ENCOUNTER — OUTPATIENT (OUTPATIENT)
Dept: OUTPATIENT SERVICES | Facility: HOSPITAL | Age: 39
LOS: 1 days | End: 2019-03-18

## 2019-03-18 ENCOUNTER — APPOINTMENT (OUTPATIENT)
Dept: ANTEPARTUM | Facility: HOSPITAL | Age: 39
End: 2019-03-18

## 2019-03-18 ENCOUNTER — ASOB RESULT (OUTPATIENT)
Age: 39
End: 2019-03-18

## 2019-03-18 ENCOUNTER — APPOINTMENT (OUTPATIENT)
Dept: ANTEPARTUM | Facility: CLINIC | Age: 39
End: 2019-03-18
Payer: MEDICAID

## 2019-03-18 DIAGNOSIS — O09.513 SUPERVISION OF ELDERLY PRIMIGRAVIDA, THIRD TRIMESTER: ICD-10-CM

## 2019-03-18 DIAGNOSIS — O35.8XX0 MATERNAL CARE FOR OTHER (SUSPECTED) FETAL ABNORMALITY AND DAMAGE, NOT APPLICABLE OR UNSPECIFIED: ICD-10-CM

## 2019-03-18 DIAGNOSIS — Z3A.33 33 WEEKS GESTATION OF PREGNANCY: ICD-10-CM

## 2019-03-18 DIAGNOSIS — O36.5930 MATERNAL CARE FOR OTHER KNOWN OR SUSPECTED POOR FETAL GROWTH, THIRD TRIMESTER, NOT APPLICABLE OR UNSPECIFIED: ICD-10-CM

## 2019-03-18 PROCEDURE — 59025 FETAL NON-STRESS TEST: CPT | Mod: 26

## 2019-03-21 ENCOUNTER — APPOINTMENT (OUTPATIENT)
Dept: ANTEPARTUM | Facility: CLINIC | Age: 39
End: 2019-03-21
Payer: MEDICAID

## 2019-03-21 ENCOUNTER — APPOINTMENT (OUTPATIENT)
Dept: ANTEPARTUM | Facility: HOSPITAL | Age: 39
End: 2019-03-21

## 2019-03-21 ENCOUNTER — OUTPATIENT (OUTPATIENT)
Dept: OUTPATIENT SERVICES | Facility: HOSPITAL | Age: 39
LOS: 1 days | End: 2019-03-21

## 2019-03-21 ENCOUNTER — ASOB RESULT (OUTPATIENT)
Age: 39
End: 2019-03-21

## 2019-03-21 PROCEDURE — 76818 FETAL BIOPHYS PROFILE W/NST: CPT | Mod: 26

## 2019-03-21 PROCEDURE — 76816 OB US FOLLOW-UP PER FETUS: CPT

## 2019-03-26 DIAGNOSIS — O35.8XX0 MATERNAL CARE FOR OTHER (SUSPECTED) FETAL ABNORMALITY AND DAMAGE, NOT APPLICABLE OR UNSPECIFIED: ICD-10-CM

## 2019-03-26 DIAGNOSIS — O36.5930 MATERNAL CARE FOR OTHER KNOWN OR SUSPECTED POOR FETAL GROWTH, THIRD TRIMESTER, NOT APPLICABLE OR UNSPECIFIED: ICD-10-CM

## 2019-03-26 DIAGNOSIS — Z3A.34 34 WEEKS GESTATION OF PREGNANCY: ICD-10-CM

## 2019-03-26 DIAGNOSIS — O09.513 SUPERVISION OF ELDERLY PRIMIGRAVIDA, THIRD TRIMESTER: ICD-10-CM

## 2019-03-27 DIAGNOSIS — O41.93X0 DISORDER OF AMNIOTIC FLUID AND MEMBRANES, UNSPECIFIED, THIRD TRIMESTER, NOT APPLICABLE OR UNSPECIFIED: ICD-10-CM

## 2019-03-27 DIAGNOSIS — O09.513 SUPERVISION OF ELDERLY PRIMIGRAVIDA, THIRD TRIMESTER: ICD-10-CM

## 2019-03-27 DIAGNOSIS — O36.5930 MATERNAL CARE FOR OTHER KNOWN OR SUSPECTED POOR FETAL GROWTH, THIRD TRIMESTER, NOT APPLICABLE OR UNSPECIFIED: ICD-10-CM

## 2019-03-28 ENCOUNTER — APPOINTMENT (OUTPATIENT)
Dept: OBGYN | Facility: CLINIC | Age: 39
End: 2019-03-28
Payer: MEDICAID

## 2019-03-28 ENCOUNTER — NON-APPOINTMENT (OUTPATIENT)
Age: 39
End: 2019-03-28

## 2019-03-28 VITALS
SYSTOLIC BLOOD PRESSURE: 112 MMHG | DIASTOLIC BLOOD PRESSURE: 81 MMHG | HEIGHT: 56 IN | BODY MASS INDEX: 29.15 KG/M2 | WEIGHT: 129.56 LBS

## 2019-03-28 DIAGNOSIS — O36.5930 MATERNAL CARE FOR OTHER KNOWN OR SUSPECTED POOR FETAL GROWTH, THIRD TRIMESTER, NOT APPLICABLE OR UNSPECIFIED: ICD-10-CM

## 2019-03-28 DIAGNOSIS — O41.93X0 DISORDER OF AMNIOTIC FLUID AND MEMBRANES, UNSPECIFIED, THIRD TRIMESTER, NOT APPLICABLE OR UNSPECIFIED: ICD-10-CM

## 2019-03-28 DIAGNOSIS — O09.513 SUPERVISION OF ELDERLY PRIMIGRAVIDA, THIRD TRIMESTER: ICD-10-CM

## 2019-03-28 DIAGNOSIS — O35.8XX0 MATERNAL CARE FOR OTHER (SUSPECTED) FETAL ABNORMALITY AND DAMAGE, NOT APPLICABLE OR UNSPECIFIED: ICD-10-CM

## 2019-03-28 DIAGNOSIS — Z3A.35 35 WEEKS GESTATION OF PREGNANCY: ICD-10-CM

## 2019-03-28 PROCEDURE — 0502F SUBSEQUENT PRENATAL CARE: CPT

## 2019-03-31 LAB
GP B STREP DNA SPEC QL NAA+PROBE: DETECTED
GP B STREP DNA SPEC QL NAA+PROBE: NORMAL
SOURCE GBS: NORMAL

## 2019-04-01 ENCOUNTER — RESULT REVIEW (OUTPATIENT)
Age: 39
End: 2019-04-01

## 2019-04-01 ENCOUNTER — APPOINTMENT (OUTPATIENT)
Dept: ANTEPARTUM | Facility: HOSPITAL | Age: 39
End: 2019-04-01

## 2019-04-01 ENCOUNTER — OUTPATIENT (OUTPATIENT)
Dept: OUTPATIENT SERVICES | Facility: HOSPITAL | Age: 39
LOS: 1 days | End: 2019-04-01

## 2019-04-01 ENCOUNTER — APPOINTMENT (OUTPATIENT)
Dept: ANTEPARTUM | Facility: CLINIC | Age: 39
End: 2019-04-01
Payer: MEDICAID

## 2019-04-01 ENCOUNTER — ASOB RESULT (OUTPATIENT)
Age: 39
End: 2019-04-01

## 2019-04-01 PROCEDURE — 59025 FETAL NON-STRESS TEST: CPT | Mod: 26

## 2019-04-04 ENCOUNTER — APPOINTMENT (OUTPATIENT)
Dept: OBGYN | Facility: CLINIC | Age: 39
End: 2019-04-04
Payer: MEDICAID

## 2019-04-04 ENCOUNTER — OUTPATIENT (OUTPATIENT)
Dept: OUTPATIENT SERVICES | Facility: HOSPITAL | Age: 39
LOS: 1 days | End: 2019-04-04

## 2019-04-04 ENCOUNTER — ASOB RESULT (OUTPATIENT)
Age: 39
End: 2019-04-04

## 2019-04-04 ENCOUNTER — APPOINTMENT (OUTPATIENT)
Dept: ANTEPARTUM | Facility: HOSPITAL | Age: 39
End: 2019-04-04

## 2019-04-04 ENCOUNTER — APPOINTMENT (OUTPATIENT)
Dept: ANTEPARTUM | Facility: CLINIC | Age: 39
End: 2019-04-04
Payer: MEDICAID

## 2019-04-04 ENCOUNTER — RESULT REVIEW (OUTPATIENT)
Age: 39
End: 2019-04-04

## 2019-04-04 VITALS
WEIGHT: 133 LBS | DIASTOLIC BLOOD PRESSURE: 70 MMHG | BODY MASS INDEX: 29.92 KG/M2 | SYSTOLIC BLOOD PRESSURE: 104 MMHG | HEIGHT: 56 IN

## 2019-04-04 PROCEDURE — 76816 OB US FOLLOW-UP PER FETUS: CPT

## 2019-04-04 PROCEDURE — 0502F SUBSEQUENT PRENATAL CARE: CPT

## 2019-04-04 PROCEDURE — 76818 FETAL BIOPHYS PROFILE W/NST: CPT | Mod: 26

## 2019-04-04 PROCEDURE — 76820 UMBILICAL ARTERY ECHO: CPT

## 2019-04-05 ENCOUNTER — NON-APPOINTMENT (OUTPATIENT)
Age: 39
End: 2019-04-05

## 2019-04-08 ENCOUNTER — OUTPATIENT (OUTPATIENT)
Dept: OUTPATIENT SERVICES | Facility: HOSPITAL | Age: 39
LOS: 1 days | End: 2019-04-08

## 2019-04-08 ENCOUNTER — APPOINTMENT (OUTPATIENT)
Dept: ANTEPARTUM | Facility: HOSPITAL | Age: 39
End: 2019-04-08

## 2019-04-08 ENCOUNTER — ASOB RESULT (OUTPATIENT)
Age: 39
End: 2019-04-08

## 2019-04-08 ENCOUNTER — APPOINTMENT (OUTPATIENT)
Dept: ANTEPARTUM | Facility: CLINIC | Age: 39
End: 2019-04-08
Payer: MEDICAID

## 2019-04-08 PROCEDURE — 76818 FETAL BIOPHYS PROFILE W/NST: CPT | Mod: 26

## 2019-04-08 PROCEDURE — 76820 UMBILICAL ARTERY ECHO: CPT

## 2019-04-09 DIAGNOSIS — Z3A.36 36 WEEKS GESTATION OF PREGNANCY: ICD-10-CM

## 2019-04-09 DIAGNOSIS — O36.5930 MATERNAL CARE FOR OTHER KNOWN OR SUSPECTED POOR FETAL GROWTH, THIRD TRIMESTER, NOT APPLICABLE OR UNSPECIFIED: ICD-10-CM

## 2019-04-09 DIAGNOSIS — O09.513 SUPERVISION OF ELDERLY PRIMIGRAVIDA, THIRD TRIMESTER: ICD-10-CM

## 2019-04-09 DIAGNOSIS — O35.8XX0 MATERNAL CARE FOR OTHER (SUSPECTED) FETAL ABNORMALITY AND DAMAGE, NOT APPLICABLE OR UNSPECIFIED: ICD-10-CM

## 2019-04-10 DIAGNOSIS — O36.5930 MATERNAL CARE FOR OTHER KNOWN OR SUSPECTED POOR FETAL GROWTH, THIRD TRIMESTER, NOT APPLICABLE OR UNSPECIFIED: ICD-10-CM

## 2019-04-10 DIAGNOSIS — O41.93X0 DISORDER OF AMNIOTIC FLUID AND MEMBRANES, UNSPECIFIED, THIRD TRIMESTER, NOT APPLICABLE OR UNSPECIFIED: ICD-10-CM

## 2019-04-10 DIAGNOSIS — O09.513 SUPERVISION OF ELDERLY PRIMIGRAVIDA, THIRD TRIMESTER: ICD-10-CM

## 2019-04-11 ENCOUNTER — OUTPATIENT (OUTPATIENT)
Dept: OUTPATIENT SERVICES | Facility: HOSPITAL | Age: 39
LOS: 1 days | End: 2019-04-11

## 2019-04-11 ENCOUNTER — NON-APPOINTMENT (OUTPATIENT)
Age: 39
End: 2019-04-11

## 2019-04-11 ENCOUNTER — ASOB RESULT (OUTPATIENT)
Age: 39
End: 2019-04-11

## 2019-04-11 ENCOUNTER — APPOINTMENT (OUTPATIENT)
Dept: ANTEPARTUM | Facility: CLINIC | Age: 39
End: 2019-04-11
Payer: MEDICAID

## 2019-04-11 ENCOUNTER — APPOINTMENT (OUTPATIENT)
Dept: OBGYN | Facility: CLINIC | Age: 39
End: 2019-04-11
Payer: MEDICAID

## 2019-04-11 VITALS
BODY MASS INDEX: 29.47 KG/M2 | HEIGHT: 56 IN | DIASTOLIC BLOOD PRESSURE: 76 MMHG | SYSTOLIC BLOOD PRESSURE: 109 MMHG | WEIGHT: 131 LBS

## 2019-04-11 DIAGNOSIS — O41.93X0 DISORDER OF AMNIOTIC FLUID AND MEMBRANES, UNSPECIFIED, THIRD TRIMESTER, NOT APPLICABLE OR UNSPECIFIED: ICD-10-CM

## 2019-04-11 DIAGNOSIS — O36.5930 MATERNAL CARE FOR OTHER KNOWN OR SUSPECTED POOR FETAL GROWTH, THIRD TRIMESTER, NOT APPLICABLE OR UNSPECIFIED: ICD-10-CM

## 2019-04-11 DIAGNOSIS — O09.513 SUPERVISION OF ELDERLY PRIMIGRAVIDA, THIRD TRIMESTER: ICD-10-CM

## 2019-04-11 PROCEDURE — 76818 FETAL BIOPHYS PROFILE W/NST: CPT | Mod: 26

## 2019-04-11 PROCEDURE — 76820 UMBILICAL ARTERY ECHO: CPT

## 2019-04-11 PROCEDURE — 0502F SUBSEQUENT PRENATAL CARE: CPT

## 2019-04-15 ENCOUNTER — ASOB RESULT (OUTPATIENT)
Age: 39
End: 2019-04-15

## 2019-04-15 ENCOUNTER — APPOINTMENT (OUTPATIENT)
Dept: ANTEPARTUM | Facility: HOSPITAL | Age: 39
End: 2019-04-15

## 2019-04-15 ENCOUNTER — APPOINTMENT (OUTPATIENT)
Dept: OBGYN | Facility: CLINIC | Age: 39
End: 2019-04-15
Payer: MEDICAID

## 2019-04-15 ENCOUNTER — APPOINTMENT (OUTPATIENT)
Dept: ANTEPARTUM | Facility: CLINIC | Age: 39
End: 2019-04-15
Payer: MEDICAID

## 2019-04-15 ENCOUNTER — CHART COPY (OUTPATIENT)
Age: 39
End: 2019-04-15

## 2019-04-15 ENCOUNTER — OUTPATIENT (OUTPATIENT)
Dept: OUTPATIENT SERVICES | Facility: HOSPITAL | Age: 39
LOS: 1 days | End: 2019-04-15

## 2019-04-15 ENCOUNTER — NON-APPOINTMENT (OUTPATIENT)
Age: 39
End: 2019-04-15

## 2019-04-15 VITALS
BODY MASS INDEX: 29.81 KG/M2 | SYSTOLIC BLOOD PRESSURE: 118 MMHG | DIASTOLIC BLOOD PRESSURE: 77 MMHG | HEIGHT: 56 IN | WEIGHT: 132.5 LBS

## 2019-04-15 PROCEDURE — 76818 FETAL BIOPHYS PROFILE W/NST: CPT | Mod: 26

## 2019-04-15 PROCEDURE — 0502F SUBSEQUENT PRENATAL CARE: CPT

## 2019-04-15 PROCEDURE — 76820 UMBILICAL ARTERY ECHO: CPT

## 2019-04-16 DIAGNOSIS — O36.5930 MATERNAL CARE FOR OTHER KNOWN OR SUSPECTED POOR FETAL GROWTH, THIRD TRIMESTER, NOT APPLICABLE OR UNSPECIFIED: ICD-10-CM

## 2019-04-16 DIAGNOSIS — O41.93X0 DISORDER OF AMNIOTIC FLUID AND MEMBRANES, UNSPECIFIED, THIRD TRIMESTER, NOT APPLICABLE OR UNSPECIFIED: ICD-10-CM

## 2019-04-16 DIAGNOSIS — O09.513 SUPERVISION OF ELDERLY PRIMIGRAVIDA, THIRD TRIMESTER: ICD-10-CM

## 2019-04-18 ENCOUNTER — APPOINTMENT (OUTPATIENT)
Dept: ANTEPARTUM | Facility: CLINIC | Age: 39
End: 2019-04-18
Payer: MEDICAID

## 2019-04-18 ENCOUNTER — APPOINTMENT (OUTPATIENT)
Dept: ANTEPARTUM | Facility: HOSPITAL | Age: 39
End: 2019-04-18

## 2019-04-18 ENCOUNTER — OUTPATIENT (OUTPATIENT)
Dept: OUTPATIENT SERVICES | Facility: HOSPITAL | Age: 39
LOS: 1 days | End: 2019-04-18

## 2019-04-18 ENCOUNTER — ASOB RESULT (OUTPATIENT)
Age: 39
End: 2019-04-18

## 2019-04-18 PROCEDURE — 76818 FETAL BIOPHYS PROFILE W/NST: CPT | Mod: 26

## 2019-04-18 PROCEDURE — 76816 OB US FOLLOW-UP PER FETUS: CPT

## 2019-04-18 PROCEDURE — 76820 UMBILICAL ARTERY ECHO: CPT

## 2019-04-19 ENCOUNTER — OUTPATIENT (OUTPATIENT)
Dept: OUTPATIENT SERVICES | Facility: HOSPITAL | Age: 39
LOS: 1 days | End: 2019-04-19

## 2019-04-19 VITALS
WEIGHT: 130.95 LBS | DIASTOLIC BLOOD PRESSURE: 78 MMHG | SYSTOLIC BLOOD PRESSURE: 106 MMHG | HEIGHT: 58 IN | TEMPERATURE: 98 F | HEART RATE: 96 BPM | RESPIRATION RATE: 16 BRPM

## 2019-04-19 DIAGNOSIS — Z98.890 OTHER SPECIFIED POSTPROCEDURAL STATES: Chronic | ICD-10-CM

## 2019-04-19 LAB
APPEARANCE UR: CLEAR — SIGNIFICANT CHANGE UP
BILIRUB UR-MCNC: NEGATIVE — SIGNIFICANT CHANGE UP
BLD GP AB SCN SERPL QL: NEGATIVE — SIGNIFICANT CHANGE UP
BLOOD UR QL VISUAL: NEGATIVE — SIGNIFICANT CHANGE UP
COLOR SPEC: YELLOW — SIGNIFICANT CHANGE UP
GLUCOSE UR-MCNC: 300 — HIGH
HCT VFR BLD CALC: 42.4 % — SIGNIFICANT CHANGE UP (ref 34.5–45)
HGB BLD-MCNC: 13.9 G/DL — SIGNIFICANT CHANGE UP (ref 11.5–15.5)
KETONES UR-MCNC: SIGNIFICANT CHANGE UP
LEUKOCYTE ESTERASE UR-ACNC: NEGATIVE — SIGNIFICANT CHANGE UP
MCHC RBC-ENTMCNC: 31.7 PG — SIGNIFICANT CHANGE UP (ref 27–34)
MCHC RBC-ENTMCNC: 32.8 % — SIGNIFICANT CHANGE UP (ref 32–36)
MCV RBC AUTO: 96.6 FL — SIGNIFICANT CHANGE UP (ref 80–100)
NITRITE UR-MCNC: NEGATIVE — SIGNIFICANT CHANGE UP
NRBC # FLD: 0 K/UL — SIGNIFICANT CHANGE UP (ref 0–0)
PH UR: 5.5 — SIGNIFICANT CHANGE UP (ref 5–8)
PLATELET # BLD AUTO: 196 K/UL — SIGNIFICANT CHANGE UP (ref 150–400)
PMV BLD: 10 FL — SIGNIFICANT CHANGE UP (ref 7–13)
PROT UR-MCNC: NEGATIVE — SIGNIFICANT CHANGE UP
RBC # BLD: 4.39 M/UL — SIGNIFICANT CHANGE UP (ref 3.8–5.2)
RBC # FLD: 14.5 % — SIGNIFICANT CHANGE UP (ref 10.3–14.5)
RH IG SCN BLD-IMP: POSITIVE — SIGNIFICANT CHANGE UP
SP GR SPEC: 1.01 — SIGNIFICANT CHANGE UP (ref 1–1.04)
T PALLIDUM AB TITR SER: NEGATIVE — SIGNIFICANT CHANGE UP
UROBILINOGEN FLD QL: NORMAL — SIGNIFICANT CHANGE UP
WBC # BLD: 11.25 K/UL — HIGH (ref 3.8–10.5)
WBC # FLD AUTO: 11.25 K/UL — HIGH (ref 3.8–10.5)

## 2019-04-19 NOTE — OB PST NOTE - PROBLEM/PLAN-1
Received fax from [facility] [place] signed documents regarding knee brace.  Spoke to [facility] they stated it was appropriate to fax PT notes where Dr Patiño agreed with the PT plan for knee brace.  Each page of PT progress note was initialed by Dr Patiño and faxed to [facility] [employer] [facility] [place].  
DISPLAY PLAN FREE TEXT

## 2019-04-19 NOTE — OB PST NOTE - NSSUBSTANCEUSE_GEN_ALL_CORE_SD
"Fabio Mcconnell is a 48 y.o. male     Chief Complaint   Patient presents with   • [facility] Care       PROBLEM LIST:     1. HTN  2. Shortness of Breath  3. Cirrhosis of the liver  3.1 S/P liver transplant in 2011  4. Acute renal failure, in 2014, at   5. Syncope and collapse due to ortho. Hypotension  6. Chronic smoker            Specialty Problems     None            HPI:  Mr. John Mcconnell is a 48-year-old white male who presents today to [facility].    The patient has been followed by a local cardiologist for ongoing care and risk modification.  He has no known coronary artery disease.  Mr. Mcconnell underwent liver transplant in 2011 and relates no idea catheterization prior to that procedure.  Therefore, presumably, there were no high risk markers on preoperative testing.  Additionally, Mr. Mcconnell states that Dr. Earl perform stress testing and echocardiography last year which were unremarkable.    Mr. Mcconnell denies any type of chest discomfort.  He has occasional shortness of breath in bed but no orthopnea or PND.  He has very mild and stable lower extremity edema.  The patient describes an episode of orthostatic syncope several weeks before the holiday season.  He was sitting and arose quickly, felt dizzy, was without chest pain, shortness of breath, or palpitations, and lost consciousness.  Wall without a distinct ictal state or Rudy's paralysis.  There is no loss of bowel or bladder control.  He has attempted to remain better hydrated since that time and now describes only mild orthostatic lightheadedness but no presyncope or syncope.  Mr. Mcconnell denies palpitations.  He has no claudication but describes nocturnal leg cramping.  He describes no symptoms of TIA or stroke.    Apparently, the patient was treated for acute multi-organ failure in 2014.  He states that \"my kidneys and my liver shutdown\".  No recent data on liver enzymes, liver function studies, or renal " function.                CURRENT MEDICATION:    Current Outpatient Medications   Medication Sig Dispense Refill   • amLODIPine (NORVASC) 10 MG tablet Take 10 mg by mouth Daily.  3   • aspirin 325 MG EC tablet Take 325 mg by mouth Daily.  1   • Cholecalciferol (VITAMIN D3) 5000 units capsule capsule TAKE 1 CAPSULE ONCE DAILY 6 DAYS A WEEK  2   • CYCLOSPORINE PO Take  by mouth. Due to liver transplant     • diazePAM (VALIUM) 5 MG tablet Take 5 mg by mouth 2 (Two) Times a Day As Needed. for anxiety  1   • isosorbide dinitrate (ISORDIL) 20 MG tablet Take 20 mg by mouth 2 (Two) Times a Day.  3   • meloxicam (MOBIC) 15 MG tablet Daily.  2   • Multiple Vitamins-Minerals (CERTAVITE/ANTIOXIDANTS) tablet Take 1 tablet by mouth Daily.  5   • mycophenolate (CELLCEPT) 500 MG tablet TAKE 2 TABLET S  TWICE DAILY  11   • oxyCODONE (ROXICODONE) 15 MG immediate release tablet TAKE ONE TABLET BY MOUTH 3 TO 4 TIMES DAILY AS NEEDED FOR PAIN  0   • pantoprazole (PROTONIX) 40 MG EC tablet Take 40 mg by mouth 2 (Two) Times a Day.  3   • tamsulosin (FLOMAX) 0.4 MG capsule 24 hr capsule Daily.  3     No current facility-administered medications for this visit.        ALLERGIES:    Morphine    PAST MEDICAL HISTORY:    Past Medical History:   Diagnosis Date   • Acute kidney failure (CMS/HCC)     medication related   • Cirrhosis of liver (CMS/HCC)    • Hypertension    • Liver failure (CMS/HCC)    • Syncope and collapse        SURGICAL HISTORY:    Past Surgical History:   Procedure Laterality Date   • KNEE SURGERY      rt.   • LIVER TRANSPLANTATION      in 2011-12       SOCIAL HISTORY:    Social History     Socioeconomic History   • Marital status: Single     Spouse name: Not on file   • Number of children: Not on file   • Years of education: Not on file   • Highest education level: Not on file   Social Needs   • Financial resource strain: Not on file   • Food insecurity - worry: Not on file   • Food insecurity - inability: Not on file   •  "Transportation needs - medical: Not on file   • Transportation needs - non-medical: Not on file   Occupational History   • Not on file   Tobacco Use   • Smoking status: Current Every Day Smoker     Types: Cigarettes   • Smokeless tobacco: Never Used   • Tobacco comment: smokes approx. 1.5 PPD since age 15   Substance and Sexual Activity   • Alcohol use: No     Frequency: Never   • Drug use: No   • Sexual activity: Not on file   Other Topics Concern   • Not on file   Social History Narrative   • Not on file       FAMILY HISTORY:    Family History   Problem Relation Age of Onset   • Atrial fibrillation Mother    • Pneumonia Mother    • Stroke Mother    • Hypertension Father    • Hyperlipidemia Father        Review of Systems   Constitutional: Positive for fatigue (decreased stamina). Negative for chills, diaphoresis and fever.   HENT: Negative.    Eyes: Negative.    Respiratory: Positive for cough and shortness of breath.    Cardiovascular: Negative.    Gastrointestinal: Positive for constipation. Negative for blood in stool (no melena,hematochezia,hematuria,hemoptysis) and diarrhea.   Endocrine: Negative.    Genitourinary: Negative.    Musculoskeletal: Positive for arthralgias and myalgias.        Denies legs cramps with ambulation, but has at night   Skin: Negative.    Allergic/Immunologic: Negative.    Neurological: Positive for dizziness, syncope (approx. 1.5 mo. ago) and light-headedness.        Denies stroke like sx's   Hematological: Negative.    Psychiatric/Behavioral: Positive for sleep disturbance.       VISIT VITALS:  Vitals:    01/29/19 1258   BP: 154/82   BP Location: Left arm   Patient Position: Sitting   Pulse: 100   SpO2: 96%   Weight: 101 kg (222 lb 3.2 oz)   Height: 175.3 cm (69\")      /82 (BP Location: Left arm, Patient Position: Sitting)   Pulse 100   Ht 175.3 cm (69\")   Wt 101 kg (222 lb 3.2 oz)   SpO2 96%   BMI 32.81 kg/m²     RECENT LABS:    Objective       Physical Exam "   Constitutional: He is oriented to person, place, and time. He appears well-developed and well-nourished. No distress.   HENT:   Head: Normocephalic and atraumatic.   No oral lesions   Eyes: Conjunctivae and EOM are normal.   miosis   Neck: Normal range of motion. Neck supple. No hepatojugular reflux and no JVD present. Carotid bruit is not present. No tracheal deviation present.   NL. Carotid upstrokes     Cardiovascular: Normal rate, regular rhythm, S1 normal, S2 normal and intact distal pulses. Exam reveals gallop and S4. Exam reveals no S3.   No murmur heard.  1-2/6 MR   Pulmonary/Chest: Effort normal and breath sounds normal. He has no wheezes.   NL. Expir. Phase  NL. Breath sound intensity  Dry Velcro crackles in rt. base   Abdominal: Soft. Bowel sounds are normal. He exhibits no distension, no abdominal bruit and no mass. There is no tenderness. There is no rebound and no guarding.   No organomegaly   Musculoskeletal: Normal range of motion. He exhibits edema. He exhibits no tenderness or deformity.   BLE, excellent pedal pulses, Nl.. Cap. Refill, trace edema     Neurological: He is alert and oriented to person, place, and time.   Skin: Skin is warm and dry. No rash noted. No erythema. No pallor.   epithelial inclusion cyst behind rt. ear   Psychiatric: He has a normal mood and affect. His behavior is normal. Judgment and thought content normal.   Nursing note and vitals reviewed.        ECG 12 Lead  Date/Time: 1/29/2019 1:49 PM  Performed by: Eamon Gutierrez MD  Authorized by: Eamon Gutierrez MD                 Assessment/Plan   #1.  Syncope.  The this episode was orthostatic in nature, the patient has had no recurrence after his initial episode.  He remains mildly orthostatic both symptomatically and by vital signs today.  We discussed mechanical measures to avoid presyncope or syncope and I don't think that further testing isn't indicated unless symptoms worsen.  It is possible that hepatic  dysfunction could be contributing to decreased intravascular volume and, secondarily, orthostasis.    #2.  By report, combined kidney and liver failure.  I'm reluctant to address the patient's blood pressures until we have an adequate assessment of his prior illness and current renal and liver function.  Therefore, we will review labs be sent 3 , and ensure we have a complete metabolic panel, CBC, and fasting lipids.    #3.  We will attempt to obtain all data from Dr. Earl's office, and from [facility].    #4.  Mr. Mcconnell will follow-up with Dr. Nichols as instructed, and in our office in 2-3 months or on a when necessary basis as discussed.   Diagnosis Plan   1. Essential hypertension  ECG 12 Lead   2. Shortness of breath  ECG 12 Lead   3. Syncope due to orthostatic hypotension     4. Cirrhosis of liver without ascites, unspecified hepatic cirrhosis type (CMS/HCC)         No Follow-up on file.         I advised John of the risks of continuing to use tobacco, and I provided him with tobacco cessation educational materials in the After Visit Summary.     During this visit, I spent <3 minutes counseling the patient regarding tobacco cessation.     Patient's Body mass index is 32.81 kg/m². BMI is above normal parameters. Recommendations include: educational material and referral to primary care.       Paris Hardwick LPN    Scribed for Dr. Eamon Gutierrez by Paris Hardwick LPN January 29, 2019 1:52 PM         Electronically signed by:            This note is dictated utilizing voice recognition software.  Although this record has been proof read, transcriptional errors may still be present. If questions occur regarding the content of this record please do not hesitate to call our office.      caffeine

## 2019-04-19 NOTE — OB PST NOTE - NSHPPHYSICALEXAM_GEN_ALL_CORE
Constitutional: Well Developed, Well Groomed, Well Nourished, No Distress    Eyes: PERRL, EOMI, conjunctiva clear    Ears: Normal    Mouth & Gums: Normal, moist    Pharynx: No tenderness, discharge, or peritonsillar abscess    Tonsils: No Redness, discharge, tenderness, or swelling    Neck: Supple, no JVD, normal thyroid glands,     Breast: exam declined    Back: Normal shape, ROM intact, strength intact, no vertebral tenderness    Respiratory: Airway patent, breath sounds equal, good air movement, respiration non-labored, clear to auscultation bilateral, no chest wall tenderness, no intercostal retractions, no rales, no wheezes, no rhonchi, no subcutaneous emphysema    Cardiovascular:  Regular rate and rhythm, no rubs or murmur, normal PMI    Gastrointestinal: Soft, non-tender, non distention, no masses palpable, bowel sound normal, no bruit, no rebound tenderness    Extremities: No clubbing, cyanosis, or pedal edema    Vascular:  Carotid Pulse normal , Radial Pulse normal,  DP pulse normal    Neurological: alert & oriented x 3, sensation intact,  normal strength    Skin: warm and dry, normal color    Lymph Nodes: normal posterior cervical lymph node, normal anterior cervical lymph node, normal supraclavicular lymph node,     Musculoskeletal: ROM intact, no joint swelling, warmth, or calf tenderness. Normal strength    Psychiatric: normal affect, normal behavior

## 2019-04-19 NOTE — OB PST NOTE - CORNEAL ABRASION RISK
----- Message from Navarro Geronimo MD sent at 4/27/2018 10:34 AM CDT -----  Please call patient with results  Looks ok     denies

## 2019-04-19 NOTE — OB PST NOTE - HISTORY OF PRESENT ILLNESS
38 year old female presents today for evaluation for ... 38 year old female presents today for evaluation for Induction of Labor for IUGR scheduled on 4/22/19.

## 2019-04-19 NOTE — OB PST NOTE - NSHPREVIEWOFSYSTEMS_GEN_ALL_CORE
General: No fever, chills, sweating, anorexia, . No polyphagia, polyurea, polydypsia, malaise, or fatigue    Skin: No rashes, itching, or dryness. No change in size/color of moles.     Breast: No tenderness, lumps, or nipple discharge      Ophthalmologic: No diplopia, photophobia, lacrimation, blurred Vision , or eye discharge    ENMT Symptoms: No hearing difficulty, ear pain, tinnitus, or vertigo. No sinus symptoms, nasal congestion, nasal   discharge, or nasal obstruction    Respiratory and Thorax: No wheezing, dyspnea, cough, hemoptysis, or pleuritic chest pain     Cardiovascular: No chest pain, palpitations, dyspnea on exertion, orthopnea, paroxysmal nocturnal dyspnea,   peripheral edema, or claudication    Gastrointestinal: No nausea, vomiting, diarrhea, constipation, change in bowel habits, flatulence, abdominal pain, or melena    Genitourinary/ Pelvis: No hematuria, renal colic, or flank pain.  No urine discoloration, incontinence, dysuria, or urinary hesitancy. Normal urinary frequency. No nocturia, abnormal vaginal bleeding, vaginal discharge, spotting, pelvic pain, or vaginal leakage    Musculoskeletal: No arthralgia, arthritis, joint swelling, muscle cramping, muscle weakness, neck pain, arm pain, or leg pain    Neurological: No transient paralysis, weakness, paresthesias, or seizures. No syncope, tremors, vertigo, loss of sensation, difficulty walking, loss of consciousness, hemiparesis, confusion, or facial palsy    Psychiatric: No suicidal ideation, depression, anxiety, insomnia, memory loss, paranoia, mood swings, agitation, hallucinations, or hyperactivity    Hematology: No gum bleeding, nose bleeding, or skin lumps    Lymphatic: No enlarged or tender lymph nodes. No extremity swelling    Endocrine: No heat or cold intolerance    Immunologic: No recurrent or persistent infections

## 2019-04-22 ENCOUNTER — APPOINTMENT (OUTPATIENT)
Dept: ANTEPARTUM | Facility: HOSPITAL | Age: 39
End: 2019-04-22

## 2019-04-22 ENCOUNTER — ASOB RESULT (OUTPATIENT)
Age: 39
End: 2019-04-22

## 2019-04-22 ENCOUNTER — APPOINTMENT (OUTPATIENT)
Dept: ANTEPARTUM | Facility: CLINIC | Age: 39
End: 2019-04-22
Payer: MEDICAID

## 2019-04-22 ENCOUNTER — OUTPATIENT (OUTPATIENT)
Dept: OUTPATIENT SERVICES | Facility: HOSPITAL | Age: 39
LOS: 1 days | End: 2019-04-22

## 2019-04-22 ENCOUNTER — INPATIENT (INPATIENT)
Facility: HOSPITAL | Age: 39
LOS: 2 days | Discharge: HOME | End: 2019-04-25
Attending: OBSTETRICS & GYNECOLOGY | Admitting: OBSTETRICS & GYNECOLOGY
Payer: MEDICAID

## 2019-04-22 VITALS
SYSTOLIC BLOOD PRESSURE: 116 MMHG | RESPIRATION RATE: 16 BRPM | DIASTOLIC BLOOD PRESSURE: 71 MMHG | TEMPERATURE: 99 F | HEART RATE: 116 BPM

## 2019-04-22 DIAGNOSIS — Z98.890 OTHER SPECIFIED POSTPROCEDURAL STATES: Chronic | ICD-10-CM

## 2019-04-22 DIAGNOSIS — O36.5990 MATERNAL CARE FOR OTHER KNOWN OR SUSPECTED POOR FETAL GROWTH, UNSPECIFIED TRIMESTER, NOT APPLICABLE OR UNSPECIFIED: ICD-10-CM

## 2019-04-22 DIAGNOSIS — O09.513 SUPERVISION OF ELDERLY PRIMIGRAVIDA, THIRD TRIMESTER: ICD-10-CM

## 2019-04-22 DIAGNOSIS — O41.93X0 DISORDER OF AMNIOTIC FLUID AND MEMBRANES, UNSPECIFIED, THIRD TRIMESTER, NOT APPLICABLE OR UNSPECIFIED: ICD-10-CM

## 2019-04-22 DIAGNOSIS — O36.5930 MATERNAL CARE FOR OTHER KNOWN OR SUSPECTED POOR FETAL GROWTH, THIRD TRIMESTER, NOT APPLICABLE OR UNSPECIFIED: ICD-10-CM

## 2019-04-22 DIAGNOSIS — O35.8XX0 MATERNAL CARE FOR OTHER (SUSPECTED) FETAL ABNORMALITY AND DAMAGE, NOT APPLICABLE OR UNSPECIFIED: ICD-10-CM

## 2019-04-22 DIAGNOSIS — Z3A.38 38 WEEKS GESTATION OF PREGNANCY: ICD-10-CM

## 2019-04-22 LAB
BASOPHILS # BLD AUTO: 0.03 K/UL — SIGNIFICANT CHANGE UP (ref 0–0.2)
BASOPHILS NFR BLD AUTO: 0.3 % — SIGNIFICANT CHANGE UP (ref 0–2)
BLD GP AB SCN SERPL QL: NEGATIVE — SIGNIFICANT CHANGE UP
EOSINOPHIL # BLD AUTO: 0.03 K/UL — SIGNIFICANT CHANGE UP (ref 0–0.5)
EOSINOPHIL NFR BLD AUTO: 0.3 % — SIGNIFICANT CHANGE UP (ref 0–6)
HCT VFR BLD CALC: 40.2 % — SIGNIFICANT CHANGE UP (ref 34.5–45)
HGB BLD-MCNC: 13.2 G/DL — SIGNIFICANT CHANGE UP (ref 11.5–15.5)
IMM GRANULOCYTES NFR BLD AUTO: 1.3 % — SIGNIFICANT CHANGE UP (ref 0–1.5)
LYMPHOCYTES # BLD AUTO: 1.53 K/UL — SIGNIFICANT CHANGE UP (ref 1–3.3)
LYMPHOCYTES # BLD AUTO: 14.1 % — SIGNIFICANT CHANGE UP (ref 13–44)
MCHC RBC-ENTMCNC: 31.4 PG — SIGNIFICANT CHANGE UP (ref 27–34)
MCHC RBC-ENTMCNC: 32.8 % — SIGNIFICANT CHANGE UP (ref 32–36)
MCV RBC AUTO: 95.7 FL — SIGNIFICANT CHANGE UP (ref 80–100)
MONOCYTES # BLD AUTO: 0.54 K/UL — SIGNIFICANT CHANGE UP (ref 0–0.9)
MONOCYTES NFR BLD AUTO: 5 % — SIGNIFICANT CHANGE UP (ref 2–14)
NEUTROPHILS # BLD AUTO: 8.55 K/UL — HIGH (ref 1.8–7.4)
NEUTROPHILS NFR BLD AUTO: 79 % — HIGH (ref 43–77)
NRBC # FLD: 0 K/UL — SIGNIFICANT CHANGE UP (ref 0–0)
PLATELET # BLD AUTO: 202 K/UL — SIGNIFICANT CHANGE UP (ref 150–400)
PMV BLD: 10.3 FL — SIGNIFICANT CHANGE UP (ref 7–13)
RBC # BLD: 4.2 M/UL — SIGNIFICANT CHANGE UP (ref 3.8–5.2)
RBC # FLD: 14.2 % — SIGNIFICANT CHANGE UP (ref 10.3–14.5)
RH IG SCN BLD-IMP: POSITIVE — SIGNIFICANT CHANGE UP
WBC # BLD: 10.82 K/UL — HIGH (ref 3.8–10.5)
WBC # FLD AUTO: 10.82 K/UL — HIGH (ref 3.8–10.5)

## 2019-04-22 PROCEDURE — 76818 FETAL BIOPHYS PROFILE W/NST: CPT | Mod: 26

## 2019-04-22 PROCEDURE — 76820 UMBILICAL ARTERY ECHO: CPT

## 2019-04-22 RX ORDER — OXYTOCIN 10 UNIT/ML
333.33 VIAL (ML) INJECTION
Qty: 20 | Refills: 0 | Status: DISCONTINUED | OUTPATIENT
Start: 2019-04-22 | End: 2019-04-23

## 2019-04-22 RX ORDER — SODIUM CHLORIDE 9 MG/ML
1000 INJECTION, SOLUTION INTRAVENOUS
Qty: 0 | Refills: 0 | Status: DISCONTINUED | OUTPATIENT
Start: 2019-04-22 | End: 2019-04-23

## 2019-04-22 RX ORDER — AMPICILLIN TRIHYDRATE 250 MG
CAPSULE ORAL
Qty: 0 | Refills: 0 | Status: DISCONTINUED | OUTPATIENT
Start: 2019-04-22 | End: 2019-04-24

## 2019-04-22 RX ORDER — SODIUM CHLORIDE 9 MG/ML
1000 INJECTION, SOLUTION INTRAVENOUS ONCE
Qty: 0 | Refills: 0 | Status: COMPLETED | OUTPATIENT
Start: 2019-04-22 | End: 2019-04-23

## 2019-04-22 RX ORDER — AMPICILLIN TRIHYDRATE 250 MG
2 CAPSULE ORAL ONCE
Qty: 0 | Refills: 0 | Status: COMPLETED | OUTPATIENT
Start: 2019-04-22 | End: 2019-04-22

## 2019-04-22 RX ORDER — CITRIC ACID/SODIUM CITRATE 300-500 MG
15 SOLUTION, ORAL ORAL EVERY 4 HOURS
Qty: 0 | Refills: 0 | Status: DISCONTINUED | OUTPATIENT
Start: 2019-04-22 | End: 2019-04-22

## 2019-04-22 RX ORDER — AMPICILLIN TRIHYDRATE 250 MG
1 CAPSULE ORAL EVERY 4 HOURS
Qty: 0 | Refills: 0 | Status: DISCONTINUED | OUTPATIENT
Start: 2019-04-22 | End: 2019-04-24

## 2019-04-22 RX ADMIN — Medication 108 GRAM(S): at 22:00

## 2019-04-22 RX ADMIN — SODIUM CHLORIDE 125 MILLILITER(S): 9 INJECTION, SOLUTION INTRAVENOUS at 18:12

## 2019-04-22 RX ADMIN — Medication 116 GRAM(S): at 18:12

## 2019-04-22 NOTE — OB PROVIDER IHI INDUCTION/AUGMENTATION NOTE - NS_CHECKALL_OBGYN_ALL_OB
FHR was reviewed/Induction / Augmentation was discussed/H&P was completed/Order was written/Contractions pattern was reviewed

## 2019-04-22 NOTE — OB PROVIDER H&P - HISTORY OF PRESENT ILLNESS
38 year old female presents today for Induction of Labor for IUGR (3rd percentile). Pt denies LOF, abd pain, VB. Reports +FM. GBS positive.     OBhx:   03/2018 SAB x1 w/ D&C  Current AP course complicated by IUGR (3rd percentile)  4/18/18 cephalic presentation, posterior placenta,MORENA  EFW 2595,   Dopples WNL  MORENA 8.28 rpt MORENA 4/22 9.54 with BPP 8/8  GYNhx:   Denies abnormal pap smears, STDS, fibroids, cysts  PMHx: Denies  PSHx:   -2018 D&C x1  Social: Denies x3  Psych: Denies    VS: 116/71, 116  Abd soft, NT, gravid  EFM: 150/mod dyllan/+ accels/no decels  [place]: q1-3min  Sono: vtx confirmed on sono   SVE: FT/50/-3    A/P: 38 year old female for Induction of Labor for IUGR (3rd percentile) with normal dopplers. EFW ~5#12 as of 4/18/18, GBS +    -Admit to L&D  -Routine labs  -IVF  -Amp for gbs ppx  -PO cytotec once contraction patterns spaces out  -Above d/w Dr. Smith buckley, NP 38 year old female  EDC 19 @39+5w presents today for Induction of Labor for IUGR (3rd percentile). Pt denies LOF, abd pain, VB. Reports +FM. GBS positive.     OBhx:   2018 SAB x1 w/ D&C  Current AP course complicated by IUGR (3rd percentile)  18 cephalic presentation, posterior placenta,MORENA  EFW 2595,   Dopples WNL  MORENA 8.28 rpt MORENA  9.54 with BPP   GYNhx:   Denies abnormal pap smears, STDS, fibroids, cysts  PMHx: Denies  PSHx:   - D&C x1  Social: Denies x3  Psych: Denies    VS: 116/71, 116  Abd soft, NT, gravid  EFM: 150/mod dyllan/+ accels/no decels  [place]: q1-3min  Sono: vtx confirmed on sono   SVE: FT/50/-3    A/P: 38 year old female for Induction of Labor for IUGR (3rd percentile) with normal dopplers. EFW ~5#12 as of 18, GBS +    -Admit to L&D  -Routine labs  -IVF  -Amp for gbs ppx  -PO cytotec once contraction patterns spaces out  -Above d/w Dr. Smith buckley, NP

## 2019-04-22 NOTE — OB PROVIDER H&P - ASSESSMENT
38 year old female presents today for Induction of Labor for IUGR (3rd percentile). Pt denies LOF, abd pain, VB. Reports +FM. GBS positive.     OBhx:   03/2018 SAB x1 w/ D&C  Current AP course complicated by IUGR (3rd percentile)  4/18/18 cephalic presentation, posterior placenta,MORENA  EFW 2595,   Dopples WNL  MORENA 8.28 rpt MORENA 4/22 9.54 with BPP 8/8  GYNhx:   Denies abnormal pap smears, STDS, fibroids, cysts  PMHx: Denies  PSHx:   -2018 D&C x1  Social: Denies x3  Psych: Denies    VS: 116/71, 116  Abd soft, NT, gravid  EFM: 150/mod dyllan/+ accels/no decels  [place]: q1-3min  Sono: vtx confirmed on sono   SVE: FT/50/-3    A/P: 38 year old female for Induction of Labor for IUGR (3rd percentile) with normal dopplers. EFW ~5#12 as of 4/18/18, GBS +    -Admit to L&D  -Routine labs  -IVF  -Amp for gbs ppx  -PO cytotec once contraction patterns spaces out  -Above d/w Dr. Smith buckley, NP 38 year old female  EDC 19 @39+5w  presents today for Induction of Labor for IUGR (3rd percentile). Pt denies LOF, abd pain, VB. Reports +FM. GBS positive.     OBhx:   2018 SAB x1 w/ D&C  Current AP course complicated by IUGR (3rd percentile)  18 cephalic presentation, posterior placenta,MORENA  EFW 2595,   Dopples WNL  MORENA 8.28 rpt MORENA  9.54 with BPP   GYNhx:   Denies abnormal pap smears, STDS, fibroids, cysts  PMHx: Denies  PSHx:   - D&C x1  Social: Denies x3  Psych: Denies    VS: 116/71, 116  Abd soft, NT, gravid  EFM: 150/mod dyllan/+ accels/no decels  [place]: q1-3min  Sono: vtx confirmed on sono   SVE: FT/50/-3    A/P: 38 year old female for Induction of Labor for IUGR (3rd percentile) with normal dopplers. EFW ~5#12 as of 18, GBS +    -Admit to L&D  -Routine labs  -IVF  -Amp for gbs ppx  -PO cytotec once contraction patterns spaces out  -Above d/w Dr. Smith buckley, NP

## 2019-04-22 NOTE — OB PROVIDER H&P - NS_OBGYNHISTORY_OBGYN_ALL_OB_FT
OBhx:   03/2018 SAB x1 w/ D&C  Current AP course complicated by IUGR (3rd percentile)  4/18/18 cephalic presentation, posterior placenta,MORENA  EFW 2595,   Dopples WNL  MORENA 8.28 rpt MORENA 4/22 9.54 with BPP 8/8  GYNhx:   Denies abnormal pap smears, STDS, fibroids, cysts  PMHx: Denies  PSHx:   -2018 D&C x1  Social: Denies x3  Psych: Denies

## 2019-04-22 NOTE — OB PROVIDER H&P - PROBLEM SELECTOR PLAN 1
-Admit to L&D for IOL  -Routine labs  -IVF  -Amp for gbs ppx  -PO cytotec once contraction patterns spaces out  -Above d/w Dr. Mtz

## 2019-04-23 ENCOUNTER — TRANSCRIPTION ENCOUNTER (OUTPATIENT)
Age: 39
End: 2019-04-23

## 2019-04-23 ENCOUNTER — RESULT REVIEW (OUTPATIENT)
Age: 39
End: 2019-04-23

## 2019-04-23 DIAGNOSIS — O41.93X0 DISORDER OF AMNIOTIC FLUID AND MEMBRANES, UNSPECIFIED, THIRD TRIMESTER, NOT APPLICABLE OR UNSPECIFIED: ICD-10-CM

## 2019-04-23 DIAGNOSIS — O09.513 SUPERVISION OF ELDERLY PRIMIGRAVIDA, THIRD TRIMESTER: ICD-10-CM

## 2019-04-23 DIAGNOSIS — O36.5930 MATERNAL CARE FOR OTHER KNOWN OR SUSPECTED POOR FETAL GROWTH, THIRD TRIMESTER, NOT APPLICABLE OR UNSPECIFIED: ICD-10-CM

## 2019-04-23 DIAGNOSIS — O35.8XX0 MATERNAL CARE FOR OTHER (SUSPECTED) FETAL ABNORMALITY AND DAMAGE, NOT APPLICABLE OR UNSPECIFIED: ICD-10-CM

## 2019-04-23 DIAGNOSIS — Z3A.39 39 WEEKS GESTATION OF PREGNANCY: ICD-10-CM

## 2019-04-23 LAB — T PALLIDUM AB TITR SER: NEGATIVE — SIGNIFICANT CHANGE UP

## 2019-04-23 PROCEDURE — 59400 OBSTETRICAL CARE: CPT | Mod: U9,UB,GC

## 2019-04-23 PROCEDURE — 88307 TISSUE EXAM BY PATHOLOGIST: CPT | Mod: 26

## 2019-04-23 RX ORDER — SODIUM CHLORIDE 9 MG/ML
3 INJECTION INTRAMUSCULAR; INTRAVENOUS; SUBCUTANEOUS EVERY 8 HOURS
Qty: 0 | Refills: 0 | Status: DISCONTINUED | OUTPATIENT
Start: 2019-04-23 | End: 2019-04-24

## 2019-04-23 RX ORDER — SIMETHICONE 80 MG/1
80 TABLET, CHEWABLE ORAL EVERY 6 HOURS
Qty: 0 | Refills: 0 | Status: DISCONTINUED | OUTPATIENT
Start: 2019-04-24 | End: 2019-04-25

## 2019-04-23 RX ORDER — IBUPROFEN 200 MG
1 TABLET ORAL
Qty: 0 | Refills: 0 | COMMUNITY
Start: 2019-04-23

## 2019-04-23 RX ORDER — GLYCERIN ADULT
1 SUPPOSITORY, RECTAL RECTAL AT BEDTIME
Qty: 0 | Refills: 0 | Status: DISCONTINUED | OUTPATIENT
Start: 2019-04-24 | End: 2019-04-25

## 2019-04-23 RX ORDER — OXYTOCIN 10 UNIT/ML
333.33 VIAL (ML) INJECTION
Qty: 20 | Refills: 0 | Status: COMPLETED | OUTPATIENT
Start: 2019-04-23

## 2019-04-23 RX ORDER — HYDROCORTISONE 1 %
1 OINTMENT (GRAM) TOPICAL EVERY 4 HOURS
Qty: 0 | Refills: 0 | Status: DISCONTINUED | OUTPATIENT
Start: 2019-04-23 | End: 2019-04-24

## 2019-04-23 RX ORDER — MAGNESIUM HYDROXIDE 400 MG/1
30 TABLET, CHEWABLE ORAL
Qty: 0 | Refills: 0 | Status: DISCONTINUED | OUTPATIENT
Start: 2019-04-24 | End: 2019-04-25

## 2019-04-23 RX ORDER — PRAMOXINE HYDROCHLORIDE 150 MG/15G
1 AEROSOL, FOAM RECTAL EVERY 4 HOURS
Qty: 0 | Refills: 0 | Status: DISCONTINUED | OUTPATIENT
Start: 2019-04-23 | End: 2019-04-24

## 2019-04-23 RX ORDER — ACETAMINOPHEN 500 MG
3 TABLET ORAL
Qty: 0 | Refills: 0 | COMMUNITY
Start: 2019-04-23

## 2019-04-23 RX ORDER — ACETAMINOPHEN 500 MG
975 TABLET ORAL EVERY 6 HOURS
Qty: 0 | Refills: 0 | Status: COMPLETED | OUTPATIENT
Start: 2019-04-23 | End: 2020-03-21

## 2019-04-23 RX ORDER — AER TRAVELER 0.5 G/1
1 SOLUTION RECTAL; TOPICAL EVERY 4 HOURS
Qty: 0 | Refills: 0 | Status: DISCONTINUED | OUTPATIENT
Start: 2019-04-24 | End: 2019-04-25

## 2019-04-23 RX ORDER — DIBUCAINE 1 %
1 OINTMENT (GRAM) RECTAL EVERY 4 HOURS
Qty: 0 | Refills: 0 | Status: DISCONTINUED | OUTPATIENT
Start: 2019-04-24 | End: 2019-04-25

## 2019-04-23 RX ORDER — TETANUS TOXOID, REDUCED DIPHTHERIA TOXOID AND ACELLULAR PERTUSSIS VACCINE, ADSORBED 5; 2.5; 8; 8; 2.5 [IU]/.5ML; [IU]/.5ML; UG/.5ML; UG/.5ML; UG/.5ML
0.5 SUSPENSION INTRAMUSCULAR ONCE
Qty: 0 | Refills: 0 | Status: COMPLETED | OUTPATIENT
Start: 2019-04-24

## 2019-04-23 RX ORDER — HYDROCORTISONE 1 %
1 OINTMENT (GRAM) TOPICAL EVERY 4 HOURS
Qty: 0 | Refills: 0 | Status: DISCONTINUED | OUTPATIENT
Start: 2019-04-24 | End: 2019-04-25

## 2019-04-23 RX ORDER — OXYTOCIN 10 UNIT/ML
1 VIAL (ML) INJECTION
Qty: 30 | Refills: 0 | Status: DISCONTINUED | OUTPATIENT
Start: 2019-04-23 | End: 2019-04-24

## 2019-04-23 RX ORDER — IBUPROFEN 200 MG
600 TABLET ORAL EVERY 6 HOURS
Qty: 0 | Refills: 0 | Status: COMPLETED | OUTPATIENT
Start: 2019-04-23 | End: 2020-03-21

## 2019-04-23 RX ORDER — LANOLIN
1 OINTMENT (GRAM) TOPICAL EVERY 6 HOURS
Qty: 0 | Refills: 0 | Status: DISCONTINUED | OUTPATIENT
Start: 2019-04-24 | End: 2019-04-25

## 2019-04-23 RX ORDER — OXYCODONE HYDROCHLORIDE 5 MG/1
5 TABLET ORAL EVERY 4 HOURS
Qty: 0 | Refills: 0 | Status: DISCONTINUED | OUTPATIENT
Start: 2019-04-23 | End: 2019-04-25

## 2019-04-23 RX ORDER — ACETAMINOPHEN 500 MG
975 TABLET ORAL ONCE
Qty: 0 | Refills: 0 | Status: COMPLETED | OUTPATIENT
Start: 2019-04-23 | End: 2019-04-23

## 2019-04-23 RX ORDER — OXYTOCIN 10 UNIT/ML
333.33 VIAL (ML) INJECTION
Qty: 20 | Refills: 0 | Status: COMPLETED | OUTPATIENT
Start: 2019-04-23 | End: 2019-04-23

## 2019-04-23 RX ORDER — DIBUCAINE 1 %
1 OINTMENT (GRAM) RECTAL EVERY 4 HOURS
Qty: 0 | Refills: 0 | Status: DISCONTINUED | OUTPATIENT
Start: 2019-04-23 | End: 2019-04-24

## 2019-04-23 RX ORDER — DOCUSATE SODIUM 100 MG
100 CAPSULE ORAL
Qty: 0 | Refills: 0 | Status: DISCONTINUED | OUTPATIENT
Start: 2019-04-24 | End: 2019-04-25

## 2019-04-23 RX ORDER — DIPHENHYDRAMINE HCL 50 MG
25 CAPSULE ORAL EVERY 6 HOURS
Qty: 0 | Refills: 0 | Status: DISCONTINUED | OUTPATIENT
Start: 2019-04-24 | End: 2019-04-25

## 2019-04-23 RX ORDER — OXYCODONE HYDROCHLORIDE 5 MG/1
5 TABLET ORAL
Qty: 0 | Refills: 0 | Status: DISCONTINUED | OUTPATIENT
Start: 2019-04-23 | End: 2019-04-25

## 2019-04-23 RX ORDER — SODIUM CHLORIDE 9 MG/ML
3 INJECTION INTRAMUSCULAR; INTRAVENOUS; SUBCUTANEOUS EVERY 8 HOURS
Qty: 0 | Refills: 0 | Status: DISCONTINUED | OUTPATIENT
Start: 2019-04-24 | End: 2019-04-25

## 2019-04-23 RX ORDER — OXYTOCIN 10 UNIT/ML
41.67 VIAL (ML) INJECTION
Qty: 20 | Refills: 0 | Status: DISCONTINUED | OUTPATIENT
Start: 2019-04-23 | End: 2019-04-24

## 2019-04-23 RX ORDER — AER TRAVELER 0.5 G/1
1 SOLUTION RECTAL; TOPICAL EVERY 4 HOURS
Qty: 0 | Refills: 0 | Status: DISCONTINUED | OUTPATIENT
Start: 2019-04-23 | End: 2019-04-24

## 2019-04-23 RX ORDER — PRAMOXINE HYDROCHLORIDE 150 MG/15G
1 AEROSOL, FOAM RECTAL EVERY 4 HOURS
Qty: 0 | Refills: 0 | Status: DISCONTINUED | OUTPATIENT
Start: 2019-04-24 | End: 2019-04-25

## 2019-04-23 RX ORDER — KETOROLAC TROMETHAMINE 30 MG/ML
30 SYRINGE (ML) INJECTION ONCE
Qty: 0 | Refills: 0 | Status: DISCONTINUED | OUTPATIENT
Start: 2019-04-23 | End: 2019-04-23

## 2019-04-23 RX ADMIN — Medication 108 GRAM(S): at 17:57

## 2019-04-23 RX ADMIN — Medication 125 MILLIUNIT(S)/MIN: at 22:25

## 2019-04-23 RX ADMIN — Medication 108 GRAM(S): at 09:53

## 2019-04-23 RX ADMIN — Medication 30 MILLIGRAM(S): at 23:33

## 2019-04-23 RX ADMIN — SODIUM CHLORIDE 2000 MILLILITER(S): 9 INJECTION, SOLUTION INTRAVENOUS at 08:55

## 2019-04-23 RX ADMIN — Medication 108 GRAM(S): at 02:00

## 2019-04-23 RX ADMIN — Medication 0.25 MILLIGRAM(S): at 12:00

## 2019-04-23 RX ADMIN — Medication 108 GRAM(S): at 06:01

## 2019-04-23 RX ADMIN — Medication 108 GRAM(S): at 14:00

## 2019-04-23 RX ADMIN — Medication 1000 MILLIUNIT(S)/MIN: at 22:24

## 2019-04-23 RX ADMIN — Medication 1 MILLIUNIT(S)/MIN: at 17:08

## 2019-04-23 RX ADMIN — Medication 975 MILLIGRAM(S): at 15:37

## 2019-04-23 NOTE — CHART NOTE - NSCHARTNOTEFT_GEN_A_CORE
Patient comfortable after epidural  FHT category 2, moderate variability but variable decels, some late  VE 4/90/-2  will continue to observe  On oxygen and repositioning

## 2019-04-23 NOTE — OB NEONATOLOGY/PEDIATRICIAN DELIVERY SUMMARY - NS_BIRTHTRAUMADETAILSA_OBGYN_ALL_OB_FT
Baby is a 39.6w GA female born to a 40yo  via . Maternal history is uncomplicated. Pregnancy history is complicated by IUGR. Maternal blood type is O+. Prenatal labs were negative, immune, and non-reactive. GBS positive, treated with Amp x6. SROM with clear fluid at 8AM (~13 hours prior to delivery). Baby emerged vigorous and crying. Warmed, dried, suctioned, and stimulated. PE significant for moulding. Apgar 9/9. EOS 0.33. Baby is a 39.6w GA female born to a 38yo  via . Maternal history is uncomplicated. Pregnancy history is complicated by IUGR. Maternal blood type is O+. Prenatal labs were negative, immune. RPR is PENDING. GBS positive, treated with Amp x6. SROM with clear fluid at 8AM (~13 hours prior to delivery). Baby emerged vigorous and crying. Warmed, dried, suctioned, and stimulated. PE significant for moulding. Apgar 9/9. EOS 0.33. Baby is a 39.6w GA female born to a 38yo  via . Maternal history is uncomplicated. Pregnancy history is complicated by IUGR. Maternal blood type is O+. Prenatal labs were negative for Hep B, HIV, and RPR. Rubella is PENDING. GBS positive, treated with Amp x6. SROM with clear fluid at 8AM (~13 hours prior to delivery). Baby emerged vigorous and crying. Warmed, dried, suctioned, and stimulated. PE significant for moulding. Apgar 9/9. EOS 0.33.

## 2019-04-23 NOTE — CHART NOTE - NSCHARTNOTEFT_GEN_A_CORE
patient comfortable  s/p terb for hyperstimulation with improvement in FHT  VE 4.5/100/-2  FHt category 1  doing well

## 2019-04-23 NOTE — DISCHARGE NOTE OB - MEDICATION SUMMARY - MEDICATIONS TO TAKE
I will START or STAY ON the medications listed below when I get home from the hospital:    ibuprofen 600 mg oral tablet  -- 1 tab(s) by mouth every 6 hours  -- Indication: For Pregnancy affected by fetal growth restriction    acetaminophen 325 mg oral tablet  -- 3 tab(s) by mouth every 6 hours  -- Indication: For Pregnancy affected by fetal growth restriction I will START or STAY ON the medications listed below when I get home from the hospital:    ibuprofen 600 mg oral tablet  -- 1 tab(s) by mouth every 6 hours, As Needed  -- Indication: For Pain    acetaminophen 325 mg oral tablet  -- 3 tab(s) by mouth every 6 hours, As Needed  -- Indication: For Pain    Prenatal Multivitamins with Folic Acid 1 mg oral tablet  -- 1 tab(s) by mouth once a day  -- Indication: For vitamins

## 2019-04-23 NOTE — CHART NOTE - NSCHARTNOTEFT_GEN_A_CORE
Patient examined due to pain.     SVE - 1/80/-3  EFM - 145/mod/+accels/-deccels  [place] - ctx q2-3 mins     Patient is an IOL for IUGR. Currently on PO cytotec. Will continue with cytotec. Patient declined pain medication at this time.    Anju Ochoa PGY-1

## 2019-04-23 NOTE — CHART NOTE - NSCHARTNOTEFT_GEN_A_CORE
R2 Note    Pt evaluate for cervical change. s/p epidural placement. BP: 93/51  FHM:150/mod dyllan/+accels /late decels  SVE:1.5/80/-2  Anesthesia consulted. s/p 122/66 R2 Note    Pt evaluate for cervical change. s/p epidural placement. BP: 93/51  FHM:150/mod dyllan/+accels /late decels  SVE:1.5/70/-2  Anesthesia consulted. s/p ephedrine. BP kql212/66. Improvement of deceleration. Will continue to monitor.   Tomas, pgy2 R2 Note    Pt evaluate for cervical change. s/p epidural placement. BP: 93/51  FHM:150/mod dyllan/+accels /late decels  SVE:1.5/70/-2 s/p 25 mcg phenylephrine  BP [id number]/66. Improvement of deceleration. Will continue to monitor.   Tomas, pgy2

## 2019-04-23 NOTE — OB RN DELIVERY SUMMARY - NS_BIRTHTRAUMADETAILSA_OBGYN_ALL_OB_FT
Baby is a 39.6w GA female born to a 40yo  via . Maternal history is uncomplicated. Pregnancy history is complicated by IUGR. Maternal blood type is O+. Prenatal labs were negative, immune. RPR is PENDING. GBS positive, treated with Amp x6. SROM with clear fluid at 8AM (~13 hours prior to delivery). Baby emerged vigorous and crying. Warmed, dried, suctioned, and stimulated. PE significant for moulding. Apgar 9/9. EOS 0.33.

## 2019-04-23 NOTE — OB PROVIDER DELIVERY SUMMARY - NSPROVIDERDELIVERYNOTE_OBGYN_ALL_OB_FT
Patient delivered over intact perineum. Second degree laceration repaired with 2-0 chromic with good restoration fo anatomy. Cervix/vagina and rectum intact.

## 2019-04-23 NOTE — CHART NOTE - NSCHARTNOTEFT_GEN_A_CORE
NP note    Pt seen for placement of IUPC. Tylenol 975 PO ordered for a rectal temp of 37.8. S/P Bolus. Repositioned to LL position with O2.   109/57, 108  /mod dyllan/- accels/no decels  [place] q1-3min  SVE 6/100/0    IUPC placed without incident  -ABove d/w Dr. Mario buckley, NP

## 2019-04-23 NOTE — DISCHARGE NOTE OB - CARE PLAN
Principal Discharge DX:	Pregnancy affected by fetal growth restriction  Goal:	routine postpartum recovery  Assessment and plan of treatment:	pelvic rest x 6 weeks

## 2019-04-23 NOTE — DISCHARGE NOTE OB - HOSPITAL COURSE
Patient admitted for induction. Received Po cytotec and pitocin and delivered viable female infant.  Second degree laceration repaired with 2-0 chromic

## 2019-04-23 NOTE — DISCHARGE NOTE OB - CARE PROVIDER_API CALL
Jasmin Martin (MD)  Obstetrics and Gynecology  [facility] [place] [address]  Phone: [phone]  Fax: [phone]  Follow Up Time:

## 2019-04-24 LAB
RUBV IGG SER-ACNC: 2.8 INDEX — SIGNIFICANT CHANGE UP
RUBV IGG SER-IMP: POSITIVE — SIGNIFICANT CHANGE UP

## 2019-04-24 RX ORDER — OXYTOCIN 10 UNIT/ML
41.67 VIAL (ML) INJECTION
Qty: 20 | Refills: 0 | Status: DISCONTINUED | OUTPATIENT
Start: 2019-04-24 | End: 2019-04-25

## 2019-04-24 RX ORDER — IBUPROFEN 200 MG
600 TABLET ORAL EVERY 6 HOURS
Qty: 0 | Refills: 0 | Status: DISCONTINUED | OUTPATIENT
Start: 2019-04-24 | End: 2019-04-25

## 2019-04-24 RX ORDER — ACETAMINOPHEN 500 MG
975 TABLET ORAL EVERY 6 HOURS
Qty: 0 | Refills: 0 | Status: DISCONTINUED | OUTPATIENT
Start: 2019-04-24 | End: 2019-04-25

## 2019-04-24 RX ORDER — TETANUS TOXOID, REDUCED DIPHTHERIA TOXOID AND ACELLULAR PERTUSSIS VACCINE, ADSORBED 5; 2.5; 8; 8; 2.5 [IU]/.5ML; [IU]/.5ML; UG/.5ML; UG/.5ML; UG/.5ML
0.5 SUSPENSION INTRAMUSCULAR ONCE
Qty: 0 | Refills: 0 | Status: COMPLETED | OUTPATIENT
Start: 2019-04-24 | End: 2019-04-24

## 2019-04-24 RX ADMIN — Medication 100 MILLIGRAM(S): at 12:05

## 2019-04-24 RX ADMIN — Medication 600 MILLIGRAM(S): at 17:17

## 2019-04-24 RX ADMIN — Medication 1 TABLET(S): at 12:05

## 2019-04-24 RX ADMIN — Medication 975 MILLIGRAM(S): at 13:05

## 2019-04-24 RX ADMIN — Medication 600 MILLIGRAM(S): at 18:00

## 2019-04-24 RX ADMIN — Medication 975 MILLIGRAM(S): at 18:00

## 2019-04-24 RX ADMIN — Medication 600 MILLIGRAM(S): at 12:05

## 2019-04-24 RX ADMIN — Medication 600 MILLIGRAM(S): at 13:05

## 2019-04-24 RX ADMIN — Medication 975 MILLIGRAM(S): at 12:04

## 2019-04-24 RX ADMIN — Medication 975 MILLIGRAM(S): at 17:17

## 2019-04-24 RX ADMIN — Medication 600 MILLIGRAM(S): at 06:09

## 2019-04-24 NOTE — PROGRESS NOTE ADULT - SUBJECTIVE AND OBJECTIVE BOX
S: Patient doing well. Minimal lochia. Pain controlled.    O: Vital Signs Last 24 Hrs  T(C): 36.9 (24 Apr 2019 10:56), Max: 37.8 (23 Apr 2019 15:13)  T(F): 98.4 (24 Apr 2019 10:56), Max: 100.04 (23 Apr 2019 15:13)  HR: 106 (24 Apr 2019 10:56) (76 - 133)  BP: 112/70 (24 Apr 2019 10:56) (91/68 - 128/80)  BP(mean): --  RR: 20 (24 Apr 2019 09:00) (16 - 20)  SpO2: 100% (24 Apr 2019 10:56) (58% - 100%)    Gen: NAD  Abd: soft, NT, ND, fundus firm below umbilicus  Lochia: moderate  Ext: no tenderness    Labs:                        13.2   10.82 )-----------( 202      ( 22 Apr 2019 17:42 )             40.2

## 2019-04-24 NOTE — LACTATION INITIAL EVALUATION - INTERVENTION OUTCOME
Pt. speaks [place] and  translated;  discussed wet and soiled diaper log, feeding cues, feeding on demand, supply and demand, benefits of breastfeeding, benefits of rooming in, safe sleep practices and safe skin to skin; encouraged to ask for assistance as needed

## 2019-04-24 NOTE — PROGRESS NOTE ADULT - SUBJECTIVE AND OBJECTIVE BOX
Anesthesia Post-op Note    POD#1 S/P     Patient is doing well.  OOBAA. Tolerating clears.  Pain is tolerable. Denies HA. Denies numbness/tingliness/pain in LE.  No residual anesthetic issues or complications noted.  T(C): 36.9 (19 @ 10:56), Max: 37.8 (19 @ 19:39)  HR: 106 (19 @ 10:56) (76 - 133)  BP: 112/70 (19 @ 10:56) (91/68 - 128/80)  RR: 20 (19 @ 09:00) (16 - 20)  SpO2: 100% (19 @ 10:56) (58% - 100%)

## 2019-04-25 VITALS
DIASTOLIC BLOOD PRESSURE: 66 MMHG | HEART RATE: 90 BPM | SYSTOLIC BLOOD PRESSURE: 104 MMHG | OXYGEN SATURATION: 100 % | RESPIRATION RATE: 16 BRPM | TEMPERATURE: 98 F

## 2019-04-25 RX ADMIN — Medication 600 MILLIGRAM(S): at 00:00

## 2019-04-25 RX ADMIN — Medication 600 MILLIGRAM(S): at 00:45

## 2019-04-25 RX ADMIN — Medication 975 MILLIGRAM(S): at 00:00

## 2019-04-25 RX ADMIN — TETANUS TOXOID, REDUCED DIPHTHERIA TOXOID AND ACELLULAR PERTUSSIS VACCINE, ADSORBED 0.5 MILLILITER(S): 5; 2.5; 8; 8; 2.5 SUSPENSION INTRAMUSCULAR at 00:00

## 2019-04-25 RX ADMIN — Medication 975 MILLIGRAM(S): at 00:45

## 2019-04-29 ENCOUNTER — MEDICATION RENEWAL (OUTPATIENT)
Age: 39
End: 2019-04-29

## 2019-04-29 DIAGNOSIS — O36.5930 MATERNAL CARE FOR OTHER KNOWN OR SUSPECTED POOR FETAL GROWTH, THIRD TRIMESTER, NOT APPLICABLE OR UNSPECIFIED: ICD-10-CM

## 2019-04-29 DIAGNOSIS — O09.513 SUPERVISION OF ELDERLY PRIMIGRAVIDA, THIRD TRIMESTER: ICD-10-CM

## 2019-04-29 DIAGNOSIS — O41.93X0 DISORDER OF AMNIOTIC FLUID AND MEMBRANES, UNSPECIFIED, THIRD TRIMESTER, NOT APPLICABLE OR UNSPECIFIED: ICD-10-CM

## 2019-04-29 RX ORDER — MULTIVIT 47/IRON/FOLATE 1/DHA 27-1-300MG
27-0.6-0.4-3 CAPSULE ORAL
Qty: 90 | Refills: 3 | Status: ACTIVE | COMMUNITY
Start: 2018-06-21 | End: 1900-01-01

## 2019-05-04 LAB — SURGICAL PATHOLOGY STUDY: SIGNIFICANT CHANGE UP

## 2019-06-06 ENCOUNTER — APPOINTMENT (OUTPATIENT)
Dept: OBGYN | Facility: CLINIC | Age: 39
End: 2019-06-06
Payer: MEDICAID

## 2019-06-06 VITALS
BODY MASS INDEX: 25.42 KG/M2 | WEIGHT: 113 LBS | SYSTOLIC BLOOD PRESSURE: 117 MMHG | HEART RATE: 94 BPM | HEIGHT: 56 IN | DIASTOLIC BLOOD PRESSURE: 74 MMHG

## 2019-06-06 PROCEDURE — 0503F POSTPARTUM CARE VISIT: CPT

## 2019-06-06 NOTE — HISTORY OF PRESENT ILLNESS
[Postpartum Follow Up] : postpartum follow up [Female] : Delivery History: baby girl [Delivery Date: ___] : on [unfilled] [Vaginal Delivery] : vaginal delivery [Delivery Date Was ___] : delivery date was [unfilled] [Pertussis Vaccine] : Pertussis vaccine administered [Girl] : baby is a girl [Infant's Name ___] : [unfilled] [___ Lbs] : [unfilled] lbs [___ Oz] : [unfilled] oz [Living at Home] : is currently living at home [Breastfeeding] : currently nursing [Bottle Feeding] : bottle feeding [BF with Difficulty] : nursing with difficulty [Complications:___] : complications include: [unfilled] [IUGR] : IUGR [Last Pap Date: ___] : Last Pap Date: [unfilled] [] : delivered by vaginal delivery [Back to Normal] : is back to normal in size [Normal] : the vagina was normal [Mild] : mild vaginal bleeding [Not Done] : Examination of breasts not done [Doing Well] : is doing well [No Sign of Infection] : is showing no signs of infection [Excellent Pain Control] : has excellent pain control [Rhogam] : Rhogam was not administered [Rubella Vaccine] : Rubella vaccine was not administered [BTL] : no tubal ligation [Resumed Menses] : has not resumed her menses [Resumed [place]ol[place]] : has not resumed intercourse [Intended Contraception] : the patient does not intended to use contraception postpartum [None] : No associated symptoms are reported [S/Sx PP Depression] : no signs/symptoms of postpartum depression [Erythema] : not erythematous [Cervix Sample Taken] : cervical sample not taken for a Pap smear [de-identified] : mostly formula feeding

## 2019-07-03 NOTE — OB RN PATIENT PROFILE - CAREGIVER
Pt to [facility] at 0725 with transport. Returned to unit at 1045. C/O pain upon return. Prn medications and environmental comforts provided.    Declines

## 2019-10-14 ENCOUNTER — APPOINTMENT (OUTPATIENT)
Dept: OBGYN | Facility: CLINIC | Age: 39
End: 2019-10-14

## 2019-11-06 ENCOUNTER — APPOINTMENT (OUTPATIENT)
Dept: OBGYN | Facility: CLINIC | Age: 39
End: 2019-11-06
Payer: MEDICAID

## 2019-11-06 VITALS
HEART RATE: 102 BPM | DIASTOLIC BLOOD PRESSURE: 77 MMHG | SYSTOLIC BLOOD PRESSURE: 122 MMHG | HEIGHT: 56 IN | BODY MASS INDEX: 24.52 KG/M2 | WEIGHT: 109 LBS

## 2019-11-06 DIAGNOSIS — Z01.419 ENCOUNTER FOR GYNECOLOGICAL EXAMINATION (GENERAL) (ROUTINE) W/OUT ABNORMAL FINDINGS: ICD-10-CM

## 2019-11-06 DIAGNOSIS — Z34.92 ENCOUNTER FOR SUPERVISION OF NORMAL PREGNANCY, UNSPECIFIED, SECOND TRIMESTER: ICD-10-CM

## 2019-11-06 DIAGNOSIS — Z34.93 ENCOUNTER FOR SUPERVISION OF NORMAL PREGNANCY, UNSPECIFIED, THIRD TRIMESTER: ICD-10-CM

## 2019-11-06 DIAGNOSIS — O36.80X0 PREGNANCY WITH INCONCLUSIVE FETAL VIABILITY, NOT APPLICABLE OR UNSPECIFIED: ICD-10-CM

## 2019-11-06 PROCEDURE — 99396 PREV VISIT EST AGE 40-64: CPT

## 2019-12-03 ENCOUNTER — FORM ENCOUNTER (OUTPATIENT)
Age: 39
End: 2019-12-03

## 2019-12-04 ENCOUNTER — APPOINTMENT (OUTPATIENT)
Dept: MAMMOGRAPHY | Facility: IMAGING CENTER | Age: 39
End: 2019-12-04
Payer: MEDICAID

## 2019-12-04 ENCOUNTER — OUTPATIENT (OUTPATIENT)
Dept: OUTPATIENT SERVICES | Facility: HOSPITAL | Age: 39
LOS: 1 days | End: 2019-12-04
Payer: MEDICAID

## 2019-12-04 DIAGNOSIS — Z01.419 ENCOUNTER FOR GYNECOLOGICAL EXAMINATION (GENERAL) (ROUTINE) WITHOUT ABNORMAL FINDINGS: ICD-10-CM

## 2019-12-04 DIAGNOSIS — Z98.890 OTHER SPECIFIED POSTPROCEDURAL STATES: Chronic | ICD-10-CM

## 2019-12-04 PROCEDURE — 77067 SCR MAMMO BI INCL CAD: CPT | Mod: 26

## 2019-12-04 PROCEDURE — 77067 SCR MAMMO BI INCL CAD: CPT

## 2019-12-04 PROCEDURE — 77063 BREAST TOMOSYNTHESIS BI: CPT | Mod: 26

## 2019-12-04 PROCEDURE — 77063 BREAST TOMOSYNTHESIS BI: CPT

## 2020-01-20 PROBLEM — O36.80X0 ENCOUNTER TO DETERMINE FETAL VIABILITY OF PREGNANCY: Status: RESOLVED | Noted: 2018-10-03 | Resolved: 2020-01-20

## 2020-01-20 PROBLEM — Z34.92 ENCOUNTER FOR PREGNANCY RELATED EXAMINATION IN SECOND TRIMESTER: Status: RESOLVED | Noted: 2018-12-25 | Resolved: 2020-01-20

## 2020-01-20 PROBLEM — Z34.93 ENCOUNTER FOR PREGNANCY RELATED EXAMINATION IN THIRD TRIMESTER: Status: RESOLVED | Noted: 2019-02-28 | Resolved: 2020-01-20

## 2020-01-20 NOTE — PHYSICAL EXAM
[Awake] : awake [Acute Distress] : no acute distress [Alert] : alert [LAD] : no lymphadenopathy [Thyroid Nodule] : no thyroid nodule [Goiter] : no goiter [Mass] : no breast mass [Nipple Discharge] : no nipple discharge [Soft] : soft [Tender] : non tender [Axillary LAD] : no axillary lymphadenopathy [Oriented x3] : oriented to person, place, and time [Uterine Adnexae] : were not tender and not enlarged [Normal] : cervix [No Bleeding] : there was no active vaginal bleeding

## 2020-01-20 NOTE — HISTORY OF PRESENT ILLNESS
[Weight Concerns] : no concerns with her weight [Contraception] : does not use contraception [Last Pap ___] : Last cervical pap smear was [unfilled] [Sexually Active] : is sexually active [Monogamous (Male Partner)] : is monogamous with a male partner [Regular Cycle Intervals] : periods have been regular

## 2020-11-09 ENCOUNTER — APPOINTMENT (OUTPATIENT)
Dept: OBGYN | Facility: CLINIC | Age: 40
End: 2020-11-09

## 2021-08-17 NOTE — OB RN PATIENT PROFILE - FAMILY HISTORY
Mother  Still living? Unknown  FH: heart disease, Age at diagnosis: Age Unknown

## 2024-10-01 NOTE — ED ADULT TRIAGE NOTE - RESPIRATORY RATE (BREATHS/MIN)
Abdomen , soft, nontender, nondistended , no guarding or rigidity , no masses palpable , normal bowel sounds , Liver and Spleen,  no hepatosplenomegaly , liver nontender
15

## 2025-02-13 NOTE — DISCHARGE NOTE OB - PATIENT PORTAL LINK FT
The differential diagnosis for patients clinical presentation includes but is not limited to: pharyngitis, covid, flu, other viral uri Differential Diagnosis You can access the [facility], offered by [facility], by registering with the following website: [email or web address]

## 2025-03-05 NOTE — CONSULT NOTE ADULT - PROBLEM/RECOMMENDATION-1
[de-identified] : Right ankle x-rays taken in office on 3/5/25 were viewed and independently interpreted: patient is skeletally immature, the epiphyses and physes are intact, there is no fracture, dislocation, or bony abnormalities appreciated, the soft tissues are unremarkable, soft tissue swelling has improved as compared to previous films, there is no signs of interval healing  R ankle XRs from [facility] MD taken on 2/15/25 were viewed and independently interpreted: patient is skeletally immature, the epiphyses and physes are intact, there is no fracture, dislocation, or bony abnormalities appreciated, there is significant soft tissue swelling over the distal lateral ankle  
DISPLAY PLAN FREE TEXT